# Patient Record
Sex: FEMALE | Race: WHITE | NOT HISPANIC OR LATINO | Employment: FULL TIME | URBAN - METROPOLITAN AREA
[De-identification: names, ages, dates, MRNs, and addresses within clinical notes are randomized per-mention and may not be internally consistent; named-entity substitution may affect disease eponyms.]

---

## 2017-02-06 ENCOUNTER — OFFICE VISIT (OUTPATIENT)
Dept: INTERNAL MEDICINE | Facility: CLINIC | Age: 37
End: 2017-02-06
Attending: FAMILY MEDICINE
Payer: COMMERCIAL

## 2017-02-06 VITALS
SYSTOLIC BLOOD PRESSURE: 128 MMHG | TEMPERATURE: 98 F | HEART RATE: 70 BPM | WEIGHT: 167.56 LBS | HEIGHT: 64 IN | DIASTOLIC BLOOD PRESSURE: 74 MMHG | BODY MASS INDEX: 28.6 KG/M2

## 2017-02-06 DIAGNOSIS — J01.00 ACUTE MAXILLARY SINUSITIS, RECURRENCE NOT SPECIFIED: ICD-10-CM

## 2017-02-06 DIAGNOSIS — F98.8 ADD (ATTENTION DEFICIT DISORDER): Primary | ICD-10-CM

## 2017-02-06 PROCEDURE — 96372 THER/PROPH/DIAG INJ SC/IM: CPT | Mod: S$GLB,,, | Performed by: FAMILY MEDICINE

## 2017-02-06 PROCEDURE — 99214 OFFICE O/P EST MOD 30 MIN: CPT | Mod: 25,S$GLB,, | Performed by: FAMILY MEDICINE

## 2017-02-06 PROCEDURE — 99999 PR PBB SHADOW E&M-EST. PATIENT-LVL III: CPT | Mod: PBBFAC,,, | Performed by: FAMILY MEDICINE

## 2017-02-06 RX ORDER — TRIAMCINOLONE ACETONIDE 40 MG/ML
40 INJECTION, SUSPENSION INTRA-ARTICULAR; INTRAMUSCULAR
Status: COMPLETED | OUTPATIENT
Start: 2017-02-06 | End: 2017-02-06

## 2017-02-06 RX ORDER — AMOXICILLIN AND CLAVULANATE POTASSIUM 875; 125 MG/1; MG/1
1 TABLET, FILM COATED ORAL 2 TIMES DAILY
Qty: 20 TABLET | Refills: 0 | Status: SHIPPED | OUTPATIENT
Start: 2017-02-06 | End: 2017-02-16

## 2017-02-06 RX ADMIN — TRIAMCINOLONE ACETONIDE 40 MG: 40 INJECTION, SUSPENSION INTRA-ARTICULAR; INTRAMUSCULAR at 09:02

## 2017-02-06 NOTE — PROGRESS NOTES
"CHIEF COMPLAINT:  One week of URI symptoms in a patient with ADD    HISTORY OF PRESENT ILLNESS: The patient is a generally healthy 36 year-old white female.  For the past week she's had increasing upper respiratory symptoms.  She's having a lot of drainage and some cough.  She is also having some chest congestion.  No fevers or chills.  No hemoptysis.  Over-the-counter medications are not helping.    She has a long history of ADD for which she previously took Adderall.  Blood pressure weight and pulse are stable    REVIEW OF SYSTEMS:  GENERAL: No fever, chills, fatigability or weight loss.  SKIN: No rashes, itching or changes in color or texture of skin.  HEAD: No headaches or recent head trauma.  EYES: Visual acuity fine. No photophobia, ocular pain or diplopia.  EARS: Denies ear pain, discharge or vertigo.  NOSE: No loss of smell, no epistaxis.  MOUTH & THROAT: No hoarseness or change in voice. No excessive gum bleeding.  NODES: Denies swollen glands.  CHEST: Denies ALFARO, cyanosis, wheezing.  CARDIOVASCULAR: Denies chest pain, PND, orthopnea or reduced exercise tolerance.  ABDOMEN: Appetite fine. No weight loss. Denies diarrhea, abdominal pain, hematemesis or blood in stool.  URINARY: No flank pain, dysuria or hematuria.  PERIPHERAL VASCULAR: No claudication or cyanosis.  MUSCULOSKELETAL: No joint stiffness or swelling.   NEUROLOGIC: No history of seizures, paralysis, alteration of gait or coordination.    SOCIAL HISTORY: The patient does not smoke.  The patient consumes alcohol socially.  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    PHYSICAL EXAMINATION:   Blood pressure 128/74, pulse 70, temperature 97.7 °F (36.5 °C), height 5' 4" (1.626 m), weight 76 kg (167 lb 8.8 oz), unknown if currently breastfeeding.  APPEARANCE: Well nourished, well developed, in no acute distress.    HEAD: Normocephalic, atraumatic.  EYES: PERRL. EOMI.  Conjunctivae without injection and  anicteric  EARS: TM's intact. " Light reflex normal. No retraction or perforation.    NOSE: Mucosa pink. Airway clear.  MOUTH & THROAT: No tonsillar enlargement. No pharyngeal erythema or exudate. No stridor.  NECK: Supple.   NODES: No cervical, axillary or inguinal lymph node enlargement.  CHEST: Lungs clear to auscultation.  No retractions are noted.  No rales or rhonchi are present.  CARDIOVASCULAR: Normal S1, S2. No rubs, murmurs or gallops.  ABDOMEN: Bowel sounds normal. Not distended. Soft. No tenderness or masses.  No ascites is noted.  MUSCULOSKELETAL:  There is no clubbing, cyanosis, or edema of the extremities x4.  There is full range of motion of the lumbar spine.  There is full range of motion of the extremities x4.  There is no deformity noted.    NEUROLOGIC:       Normal speech development.      Hearing normal.      Slightly antalgic gait.      Motor and sensory exams grossly normal.      DTR's normal.  PSYCHIATRIC: Patient is alert and oriented x3.  Thought processes are all normal.  There is no homicidality.  There is no suicidality.  There is no evidence of psychosis.    LABORATORY/RADIOLOGY:   Chart reviewed.      ASSESSMENT:   Acute bronchitis  ADD     PLAN:  A Kenalog injection was given in the clinic today.  I discussed the risks and benefits of steroid injection with the patient.  The risks include liponecrosis, exacerbation of diabetes, specifically elevated blood sugars, adrenal suppression, and markedly elevated mood and energy.  The patient is aware of and accepts these risks.  10 day course of Augmentin  Adderall 15 mg by mouth twice a day  Return to clinic in 3 months

## 2017-02-06 NOTE — MR AVS SNAPSHOT
[facility]  [address]  [facility] [address]  Phone: [phone]  Fax: [phone]                  Patrizia Bullock   2017 8:00 AM   Office Visit    Description:  Female : 1980   Provider:  Prince Horton MD   Department:  [facility]           Reason for Visit     Sinus Problem           Diagnoses this Visit        Comments    Acute maxillary sinusitis, recurrence not specified    -  Primary            To Do List           Future Appointments        Provider Department Dept Phone    2017 9:00 AM Gregory Elise OD [facility] [phone]      Goals (5 Years of Data)     None       These Medications        Disp Refills Start End    amoxicillin-clavulanate 875-125mg (AUGMENTIN) 875-125 mg per tablet 20 tablet 0 2017    Take 1 tablet by mouth 2 (two) times daily. - Oral    Pharmacy: [facility] - [place], [place] - [address] Ph #: [phone]         [facility]     [facility] -  Assistance  Registered nurses in the [facility] provide clinical advisement, health education, appointment booking, and other advisory services.  Call for this free service at [phone].             Medications           Message regarding Medications     Verify the changes and/or additions to your medication regime listed below are the same as discussed with your clinician today.  If any of these changes or additions are incorrect, please notify your healthcare provider.        START taking these NEW medications        Refills    amoxicillin-clavulanate 875-125mg (AUGMENTIN) 875-125 mg per tablet 0    Sig: Take 1 tablet by mouth 2 (two) times daily.    Class: Normal    Route: Oral      These medications were administered today        Dose Freq    triamcinolone acetonide injection 40 mg 40 mg Clinic/HOD 1 time    Sig: Inject 1 mL (40 mg total) into  "the muscle one time.    Class: Normal    Route: Intramuscular      STOP taking these medications     alprazolam (XANAX) 0.5 MG tablet            Verify that the below list of medications is an accurate representation of the medications you are currently taking.  If none reported, the list may be blank. If incorrect, please contact your healthcare provider. Carry this list with you in case of emergency.           Current Medications     dextroamphetamine-amphetamine (ADDERALL) 10 mg Tab Take 1 tablet by mouth 3 (three) times daily.    dextroamphetamine-amphetamine (ADDERALL) 10 mg Tab Take 1 tablet by mouth 3 (three) times daily.    dextroamphetamine-amphetamine (ADDERALL) 10 mg Tab Take 1 tablet by mouth 3 (three) times daily.    esomeprazole (NEXIUM) 20 MG capsule Take 20 mg by mouth before breakfast.    MINASTRIN 24 FE 1 mg-20 mcg(24) /75 mg (4) Chew     amoxicillin-clavulanate 875-125mg (AUGMENTIN) 875-125 mg per tablet Take 1 tablet by mouth 2 (two) times daily.           Clinical Reference Information           Your Vitals Were     BP Pulse Temp Height Weight BMI    128/74 70 97.7 °F (36.5 °C) 5' 4" (1.626 m) 76 kg (167 lb 8.8 oz) 28.76 kg/m2      Blood Pressure          Most Recent Value    BP  128/74      Allergies as of 2/6/2017     Erythromycin Estolate    Sulfa (Sulfonamide Antibiotics)      Immunizations Administered on Date of Encounter - 2/6/2017     None      Administrations This Visit     triamcinolone acetonide injection 40 mg     Admin Date Action Dose Route Administered By             02/06/2017 Given 40 mg Intramuscular Deysi Escalona LPN                      Instructions    Your test results will be communicated to you via: [facility], Telephone or Letter.  If you have not received your test results within one week. Please contact the clinic.           Language Assistance Services     ATTENTION: Language assistance services are available, free of charge. Please call [phone].      ATENCIÓN: " Si habla español, tiene a lauren disposición servicios gratuitos de asistencia lingüística. Llame al [phone].     CHÚ Ý: N?u b?n nói Ti?ng Vi?t, có các d?ch v? h? tr? ngôn ng? mi?n phí dành cho b?n. G?i s? [phone].         [facility] complies with applicable Federal civil rights laws and does not discriminate on the basis of race, color, national origin, age, disability, or sex.

## 2017-02-06 NOTE — PROGRESS NOTES
Patient given Kenalog 40mg/ml in the Left Upper Outer Quad Gluteus. Patient tolerated well and Band-Aid was applied. Lot#[id number]. Patient advised to wait in the lobby for 15 min to make sure no adverse reactions occur. Patient states verbal understanding and has no further questions.

## 2017-02-07 ENCOUNTER — PATIENT MESSAGE (OUTPATIENT)
Dept: INTERNAL MEDICINE | Facility: CLINIC | Age: 37
End: 2017-02-07

## 2017-02-09 ENCOUNTER — PATIENT MESSAGE (OUTPATIENT)
Dept: INTERNAL MEDICINE | Facility: CLINIC | Age: 37
End: 2017-02-09

## 2017-02-09 ENCOUNTER — HOSPITAL ENCOUNTER (OUTPATIENT)
Dept: RADIOLOGY | Facility: HOSPITAL | Age: 37
Discharge: HOME | End: 2017-02-09
Attending: FAMILY MEDICINE
Payer: COMMERCIAL

## 2017-02-09 DIAGNOSIS — R06.02 SOB (SHORTNESS OF BREATH): Primary | ICD-10-CM

## 2017-02-09 DIAGNOSIS — R06.02 SOB (SHORTNESS OF BREATH): ICD-10-CM

## 2017-02-09 PROCEDURE — 71020 XR CHEST PA AND LATERAL: CPT | Mod: 26,,, | Performed by: RADIOLOGY

## 2017-02-09 PROCEDURE — 71020 XR CHEST PA AND LATERAL: CPT | Mod: TC

## 2017-02-09 RX ORDER — PREDNISONE 10 MG/1
10 TABLET ORAL 2 TIMES DAILY
Qty: 14 TABLET | Refills: 0 | Status: SHIPPED | OUTPATIENT
Start: 2017-02-09 | End: 2017-02-16

## 2017-02-13 ENCOUNTER — TELEPHONE (OUTPATIENT)
Dept: INTERNAL MEDICINE | Facility: CLINIC | Age: 37
End: 2017-02-13

## 2017-02-13 ENCOUNTER — PATIENT MESSAGE (OUTPATIENT)
Dept: INTERNAL MEDICINE | Facility: CLINIC | Age: 37
End: 2017-02-13

## 2017-02-13 NOTE — TELEPHONE ENCOUNTER
----- Message from Albania Mcdonough sent at 2/13/2017  3:42 PM CST -----  Contact: Dutch MCGEE_  1st Request  _  2nd Request  _  3rd Request    Who:Molly with [facility]     Why: Molly with [facility] states she would like a call back with the diagnoses code for the patient Adderall prescription     What Number to Call Back: Ext [phone]    When to Expect a call back: (Before the end of the day)   -- if call after 3:00 call back will be tomorrow.

## 2017-02-16 ENCOUNTER — OFFICE VISIT (OUTPATIENT)
Dept: OPTOMETRY | Facility: CLINIC | Age: 37
End: 2017-02-16
Payer: COMMERCIAL

## 2017-02-16 DIAGNOSIS — H52.13 MYOPIA OF BOTH EYES: Primary | ICD-10-CM

## 2017-02-16 PROCEDURE — 99999 PR PBB SHADOW E&M-EST. PATIENT-LVL II: CPT | Mod: PBBFAC,,, | Performed by: OPTOMETRIST

## 2017-02-16 PROCEDURE — 92310 CONTACT LENS FITTING OU: CPT | Mod: ,,, | Performed by: OPTOMETRIST

## 2017-02-16 PROCEDURE — 92014 COMPRE OPH EXAM EST PT 1/>: CPT | Mod: S$GLB,,, | Performed by: OPTOMETRIST

## 2017-02-16 PROCEDURE — 92015 DETERMINE REFRACTIVE STATE: CPT | Mod: S$GLB,,, | Performed by: OPTOMETRIST

## 2017-02-16 NOTE — PROGRESS NOTES
HPI     DLS: 12/24/15    Pt here for check of ocular health.  Pt without visual complaints today   OU.  Pt states she isn't happy with the ProClear contacts.     (-)flashes  (-)floaters  (-)itching  (-)tearing  (-)burning  (+)headaches  (-)eye pain        Last edited by Casi Campuzano MA on 2/16/2017  9:17 AM.     ROS     Negative for: Constitutional, Gastrointestinal, Neurological, Skin,   Genitourinary, Musculoskeletal, HENT, Endocrine, Cardiovascular, Eyes,   Respiratory, Psychiatric, Allergic/Imm, Heme/Lymph    Last edited by Gregory Elise, OD on 2/16/2017  9:29 AM. (History)        Assessment /Plan     For exam results, see Encounter Report.    Myopia of both eyes      1. Spec Rx given and  Contact lens trials dispensed to pt. Daily wear only advised, with education to risks of extended wear.  Discussed lens care, compliance and solutions.  RTC for dfe, pt chose to defer until later date . Different lens options discussed with patient.

## 2017-02-24 ENCOUNTER — OFFICE VISIT (OUTPATIENT)
Dept: OPTOMETRY | Facility: CLINIC | Age: 37
End: 2017-02-24
Payer: COMMERCIAL

## 2017-02-24 DIAGNOSIS — H10.533 CONTACT BLEPHAROCONJUNCTIVITIS OF BOTH EYES: Primary | ICD-10-CM

## 2017-02-24 PROCEDURE — 92012 INTRM OPH EXAM EST PATIENT: CPT | Mod: S$GLB,,, | Performed by: OPTOMETRIST

## 2017-02-24 PROCEDURE — 99999 PR PBB SHADOW E&M-EST. PATIENT-LVL II: CPT | Mod: PBBFAC,,, | Performed by: OPTOMETRIST

## 2017-02-24 RX ORDER — TOBRAMYCIN 3 MG/ML
1 SOLUTION/ DROPS OPHTHALMIC 4 TIMES DAILY
Qty: 5 ML | Refills: 0 | Status: SHIPPED | OUTPATIENT
Start: 2017-02-24 | End: 2017-03-03

## 2017-02-24 NOTE — MEDICAL/APP STUDENT
Eye irritation. Started 2 days ago after riding in [employer]  Mucous discharge and crusty in the morning.  Pain OS>OD  (+) light sensitivity    No change in vision.   Floaters OS started yesterday afternoon    (-)flashes of light    No gtts

## 2017-02-24 NOTE — PROGRESS NOTES
HPI     Eye Problem    Additional comments: Left eye           Comments   Mucous and redness OS>OD x 1-2 days, noticed after parade  Was wearing contacts  No vision loss         Last edited by Gregory Elise, OD on 2/24/2017 10:23 AM. (History)        ROS     Negative for: Constitutional, Gastrointestinal, Neurological, Skin,   Genitourinary, Musculoskeletal, HENT, Endocrine, Cardiovascular, Eyes,   Respiratory, Psychiatric, Allergic/Imm, Heme/Lymph    Last edited by Gregory Elise, OD on 2/24/2017 10:17 AM. (History)        Assessment /Plan     For exam results, see Encounter Report.    Contact blepharoconjunctivitis of both eyes  -     tobramycin sulfate 0.3% (TOBREX) 0.3 % ophthalmic solution; Place 1 drop into both eyes 4 (four) times daily.  Dispense: 5 mL; Refill: 0      1. Start Tobrex drops 1 drop 4x/day x 1 week. No contact lens wear. OK for lid scrubs also. Ocusoft or baby shampoo. RTC 1 week if no better.

## 2017-03-08 ENCOUNTER — OFFICE VISIT (OUTPATIENT)
Dept: DERMATOLOGY | Facility: CLINIC | Age: 37
End: 2017-03-08
Payer: COMMERCIAL

## 2017-03-08 DIAGNOSIS — D22.9 NEVUS: ICD-10-CM

## 2017-03-08 DIAGNOSIS — D18.00 ANGIOMA: ICD-10-CM

## 2017-03-08 DIAGNOSIS — L82.1 SK (SEBORRHEIC KERATOSIS): ICD-10-CM

## 2017-03-08 DIAGNOSIS — L81.4 LENTIGO: ICD-10-CM

## 2017-03-08 DIAGNOSIS — D48.9 NEOPLASM OF UNCERTAIN BEHAVIOR: Primary | ICD-10-CM

## 2017-03-08 PROCEDURE — 99999 PR PBB SHADOW E&M-EST. PATIENT-LVL II: CPT | Mod: PBBFAC,,, | Performed by: DERMATOLOGY

## 2017-03-08 PROCEDURE — 88342 IMHCHEM/IMCYTCHM 1ST ANTB: CPT | Mod: 26,,, | Performed by: PATHOLOGY

## 2017-03-08 PROCEDURE — 11101 PR BIOPSY, EACH ADDED LESION: CPT | Mod: S$GLB,,, | Performed by: DERMATOLOGY

## 2017-03-08 PROCEDURE — 99203 OFFICE O/P NEW LOW 30 MIN: CPT | Mod: 25,S$GLB,, | Performed by: DERMATOLOGY

## 2017-03-08 PROCEDURE — 88305 TISSUE EXAM BY PATHOLOGIST: CPT | Performed by: PATHOLOGY

## 2017-03-08 PROCEDURE — 11100 PR BIOPSY OF SKIN LESION: CPT | Mod: S$GLB,,, | Performed by: DERMATOLOGY

## 2017-03-08 NOTE — PROGRESS NOTES
"  Subjective:       Patient ID:  Patrizia Bullock is a 36 y.o. female who presents for   Chief Complaint   Patient presents with    Skin Check     tbse     HPI Comments: Patient is here today for a "mole" check.   Pt has a history of  significant sun exposure in the past.   Pt recalls several blistering sunburns in the past- yes  Pt has history of tanning bed use- yes  Pt has  had moles removed in the past- yes  Pt has history of melanoma in first degree relatives-  Yes, dad    Pt has a mole on left leg x 5 years.  getting larger.  Not bleeding or tender. No tx.       Review of Systems   Constitutional: Negative for fever, chills, weight loss, fatigue, night sweats and malaise.   Skin: Positive for daily sunscreen use. Negative for activity-related sunscreen use.   Hematologic/Lymphatic: Negative for adenopathy. Does not bruise/bleed easily.        Objective:    Physical Exam   Constitutional: She appears well-developed and well-nourished. No distress.   Neurological: She is alert and oriented to person, place, and time. She is not disoriented.   Psychiatric: She has a normal mood and affect.   Skin:   Areas Examined (abnormalities noted in diagram):   Scalp / Hair Palpated and Inspected  Head / Face Inspection Performed  Neck Inspection Performed  Chest / Axilla Inspection Performed  Abdomen Inspection Performed  Genitals / Buttocks / Groin Inspection Performed  Back Inspection Performed  RUE Inspected  LUE Inspection Performed  RLE Inspected  LLE Inspection Performed  Nails and Digits Inspection Performed                       Diagram Legend     Erythematous scaling macule/papule c/w actinic keratosis       Vascular papule c/w angioma      Pigmented verrucoid papule/plaque c/w seborrheic keratosis      Yellow umbilicated papule c/w sebaceous hyperplasia      Irregularly shaped tan macule c/w lentigo     1-2 mm smooth white papules consistent with Milia      Movable subcutaneous cyst with punctum c/w epidermal " inclusion cyst      Subcutaneous movable cyst c/w pilar cyst      Firm pink to brown papule c/w dermatofibroma      Pedunculated fleshy papule(s) c/w skin tag(s)      Evenly pigmented macule c/w junctional nevus     Mildly variegated pigmented, slightly irregular-bordered macule c/w mildly atypical nevus      Flesh colored to evenly pigmented papule c/w intradermal nevus       Pink pearly papule/plaque c/w basal cell carcinoma      Erythematous hyperkeratotic cursted plaque c/w SCC      Surgical scar with no sign of skin cancer recurrence      Open and closed comedones      Inflammatory papules and pustules      Verrucoid papule consistent consistent with wart     Erythematous eczematous patches and plaques     Dystrophic onycholytic nail with subungual debris c/w onychomycosis     Umbilicated papule    Erythematous-base heme-crusted tan verrucoid plaque consistent with inflamed seborrheic keratosis     Erythematous Silvery Scaling Plaque c/w Psoriasis     See annotation      Assessment / Plan:      Pathology Orders:      Normal Orders This Visit    Tissue Specimen To Pathology, Dermatology     Questions:    Directional Terms:  Other(comment)    Clinical information:  r/o atypical nevus    Specific Site:  left upper back    Tissue Specimen To Pathology, Dermatology     Questions:    Directional Terms:  Other(comment)    Clinical information:  r/o atypical nevus    Specific Site:  left lower posterior calf        Neoplasm of uncertain behavior  Shave biopsy procedure note: x 2     Shave biopsy performed after verbal consent including risk of infection, scar, recurrence, need for additional treatment of site. Area prepped with alcohol, anesthetized with approximately 1.0cc of 1% lidocaine with epinephrine. Lesional tissue shaved with razor blade. Hemostasis achieved with application of aluminum chloride followed by hyfrecation. No complications. Dressing applied. Wound care explained.      -     Tissue Specimen To  Pathology, Dermatology  -     Tissue Specimen To Pathology, Dermatology    Angioma  These are benign vascular lesions that are inherited.  Treatment is not necessary.    Lentigo  This is a benign hyperpigmented sun induced lesion. Daily sun protection will reduce the number of new lesions. Treatment of these benign lesions are considered cosmetic.  The nature of sun-induced photo-aging and skin cancers is discussed.  Sun avoidance, protective clothing, and the use of 30-SPF sunscreens is advised. Observe closely for skin damage/changes, and call if such occurs.    Nevus  Discussed ABCDE's of nevi.  Monitor for new mole or moles that are becoming bigger, darker, irritated, or developing irregular borders. Brochure provided.    SK (seborrheic keratosis)  These are benign inherited growths without a malignant potential. Reassurance given to patient. No treatment is necessary.              Return in about 1 year (around 3/8/2018).

## 2017-03-10 ENCOUNTER — OFFICE VISIT (OUTPATIENT)
Dept: PODIATRY | Facility: CLINIC | Age: 37
End: 2017-03-10
Payer: COMMERCIAL

## 2017-03-10 VITALS
HEIGHT: 64 IN | DIASTOLIC BLOOD PRESSURE: 76 MMHG | HEART RATE: 70 BPM | WEIGHT: 170 LBS | BODY MASS INDEX: 29.02 KG/M2 | SYSTOLIC BLOOD PRESSURE: 111 MMHG

## 2017-03-10 DIAGNOSIS — L90.5 SCAR: Primary | ICD-10-CM

## 2017-03-10 DIAGNOSIS — M79.674 TOE PAIN, RIGHT: ICD-10-CM

## 2017-03-10 PROCEDURE — 11055 PARING/CUTG B9 HYPRKER LES 1: CPT | Mod: S$GLB,,, | Performed by: PODIATRIST

## 2017-03-10 PROCEDURE — 99999 PR PBB SHADOW E&M-EST. PATIENT-LVL III: CPT | Mod: PBBFAC,,, | Performed by: PODIATRIST

## 2017-03-10 PROCEDURE — 99202 OFFICE O/P NEW SF 15 MIN: CPT | Mod: 25,S$GLB,, | Performed by: PODIATRIST

## 2017-03-10 RX ORDER — UREA 40 %
CREAM (GRAM) TOPICAL DAILY
Qty: 28 G | Refills: 11 | Status: SHIPPED | OUTPATIENT
Start: 2017-03-10 | End: 2017-10-17

## 2017-03-10 NOTE — MR AVS SNAPSHOT
[facility]  [address]  [facility] [address]  Phone: [phone]                  Patrizia Bullock   3/10/2017 10:00 AM   Office Visit    Description:  Female : 1980   Provider:  Gurwinder Clement DPM   Department:  [facility]           Reason for Visit     Foot Problem           Diagnoses this Visit        Comments    Scar    -  Primary     Toe pain, right                To Do List           Future Appointments        Provider Department Dept Phone    3/15/2017 9:20 AM Prince Horton MD [facility] Internal Medicine [phone]      Goals (5 Years of Data)     None      Follow-Up and Disposition     Return if symptoms worsen or fail to improve.       These Medications        Disp Refills Start End    urea (CARMOL) 40 % Crea 28 g 11 3/10/2017     Apply topically once daily. - Topical    Pharmacy: [id number] [place] [facility] [address] [facility]: [phone]         [facility]     [facility] Care [facility] -  Assistance  Registered nurses in the [facility] On Call Center provide clinical advisement, health education, appointment booking, and other advisory services.  Call for this free service at [phone].             Medications           Message regarding Medications     Verify the changes and/or additions to your medication regime listed below are the same as discussed with your clinician today.  If any of these changes or additions are incorrect, please notify your healthcare provider.        START taking these NEW medications        Refills    urea (CARMOL) 40 % Crea 11    Sig: Apply topically once daily.    Class: Normal    Route: Topical           Verify that the below list of medications is an accurate representation of the medications you are currently taking.  If none reported, the list may be blank. If incorrect, please contact your healthcare provider. Carry this list with you in case of emergency.           Current  "Medications     dextroamphetamine-amphetamine (ADDERALL) 10 mg Tab Take 1 tablet by mouth 3 (three) times daily.    esomeprazole (NEXIUM) 20 MG capsule Take 20 mg by mouth before breakfast.    urea (CARMOL) 40 % Crea Apply topically once daily.           Clinical Reference Information           Your Vitals Were     BP Pulse Height Weight BMI    111/76 70 5' 4" (1.626 m) 77.1 kg (170 lb) 29.18 kg/m2      Blood Pressure          Most Recent Value    BP  111/76      Allergies as of 3/10/2017     Erythromycin Estolate    Sulfa (Sulfonamide Antibiotics)      Immunizations Administered on Date of Encounter - 3/10/2017     None      Language Assistance Services     ATTENTION: Language assistance services are available, free of charge. Please call [phone].      ATENCIÓN: Si светлана suni, tiene a lauren disposición servicios gratuitos de asistencia lingüística. Llame al [phone].     KOBY Ý: N?u b?n nói Ti?ng Vi?t, có các d?ch v? h? tr? ngôn ng? mi?n phí dành cho b?n. G?i s? [phone].         [facility] complies with applicable Federal civil rights laws and does not discriminate on the basis of race, color, national origin, age, disability, or sex.        "

## 2017-03-10 NOTE — PROGRESS NOTES
Subjective:      Patient ID: Patrizia Bullock is a 36 y.o. female.    Chief Complaint: Foot Problem (glass been stuck in foot for a tr in a half)    Sharp deep pain right great toe with skin lesion she removed shards of glass from about a year and a half ago.  Denies repeat puncture wound.  Gradual onset, worsening over past several weeks, aggravated by increased weight bearing, shoe gear, pressure.  No previous medical treatment. Only tx is self removal glass.      Review of Systems   Constitution: Negative for chills, diaphoresis, fever, malaise/fatigue and night sweats.   Cardiovascular: Negative for claudication, cyanosis, leg swelling and syncope.   Skin: Positive for suspicious lesions. Negative for color change, dry skin, rash and unusual hair distribution.   Musculoskeletal: Negative for falls, joint pain, joint swelling, muscle cramps, muscle weakness and stiffness.   Gastrointestinal: Negative for constipation, diarrhea, nausea and vomiting.   Neurological: Negative for brief paralysis, disturbances in coordination, focal weakness, numbness, paresthesias, sensory change and tremors.           Objective:      Physical Exam   Constitutional: She appears well-developed and well-nourished. She is cooperative. No distress.   Cardiovascular:   Pulses:       Popliteal pulses are 2+ on the right side, and 2+ on the left side.        Dorsalis pedis pulses are 2+ on the right side, and 2+ on the left side.        Posterior tibial pulses are 2+ on the right side, and 2+ on the left side.   Capillary refill 3 seconds all toes/distal feet, all toes/both feet warm to touch.      Negative lymphadenopathy bilateral popliteal fossa and tarsal tunnel.      Negavie lower extremity edema bilateral.     Musculoskeletal:        Right ankle: Normal. She exhibits normal range of motion, no swelling, no ecchymosis, no deformity, no laceration and normal pulse. Achilles tendon normal. Achilles tendon exhibits no pain, no defect  and normal Riizarry's test results.   Normal angle, base, station of gait. All ten toes without clubbing, cyanosis, or signs of ischemia.  No pain to palpation bilateral lower extremities.  Range of motion, stability, muscle strength, and muscle tone normal bilateral feet and legs.     Lymphadenopathy: No inguinal adenopathy noted on the right or left side.   Negative lymphadenopathy bilateral popliteal fossa and tarsal tunnel.   Neurological: She is alert. She has normal strength. She displays no atrophy and no tremor. No sensory deficit. She exhibits normal muscle tone. She displays no seizure activity. Gait normal.   Reflex Scores:       Patellar reflexes are 2+ on the right side and 2+ on the left side.       Achilles reflexes are 2+ on the right side and 2+ on the left side.  Negative tinel sign to percussion sural, superficial peroneal, deep peroneal, saphenous, and posterior tibial nerves right and left ankles and feet.     Skin: Skin is warm, dry and intact. No abrasion, no bruising, no burn, no ecchymosis, no laceration, no lesion and no rash noted. She is not diaphoretic. No cyanosis or erythema. No pallor. Nails show no clubbing.     Focal hyperkeratotic scar lesion consisting entirely of hyperkeratotic tissue without open skin, drainage, pus, fluctuance, malodor, or signs of infection plantar central right hallux beneath distal phalanx.    Otherwise,Skin is normal age and health appropriate color, turgor, texture, and temperature bilateral lower extremities without ulceration, hyperpigmentation, discoloration, masses nodules or cords palpated.  No ecchymosis, erythema, edema, or cardinal signs of infection bilateral lower extremities.  .                 Assessment:       Encounter Diagnoses   Name Primary?    Scar Yes    Toe pain, right          Plan:       Patrizia was seen today for foot problem.    Diagnoses and all orders for this visit:    Scar    Toe pain, right    Other orders  -     urea  (CARMOL) 40 % Crea; Apply topically once daily.      I counseled the patient on her conditions, their implications and medical management.        With the patient's permission, I debrided hyperkeratotic scar lesion(s) as above totaling      1 plantar right hallux          to, not  Including dermis with sterile #15 blade.  Patient tolerated the procedure well and related significant relief.    Rx urea.    Discussed conservative treatment with shoes of adequate dimensions, material, and style to alleviate symptoms and delay or prevent surgical intervention.         Return if symptoms worsen or fail to improve.

## 2017-03-16 ENCOUNTER — PATIENT MESSAGE (OUTPATIENT)
Dept: DERMATOLOGY | Facility: CLINIC | Age: 37
End: 2017-03-16

## 2017-03-16 ENCOUNTER — OFFICE VISIT (OUTPATIENT)
Dept: INTERNAL MEDICINE | Facility: CLINIC | Age: 37
End: 2017-03-16
Attending: FAMILY MEDICINE
Payer: COMMERCIAL

## 2017-03-16 VITALS
BODY MASS INDEX: 28 KG/M2 | HEART RATE: 80 BPM | HEIGHT: 64 IN | WEIGHT: 164 LBS | SYSTOLIC BLOOD PRESSURE: 104 MMHG | DIASTOLIC BLOOD PRESSURE: 78 MMHG

## 2017-03-16 DIAGNOSIS — F98.8 ADD (ATTENTION DEFICIT DISORDER): Primary | ICD-10-CM

## 2017-03-16 PROCEDURE — 99999 PR PBB SHADOW E&M-EST. PATIENT-LVL II: CPT | Mod: PBBFAC,,, | Performed by: FAMILY MEDICINE

## 2017-03-16 PROCEDURE — 99213 OFFICE O/P EST LOW 20 MIN: CPT | Mod: S$GLB,,, | Performed by: FAMILY MEDICINE

## 2017-03-16 RX ORDER — DEXTROAMPHETAMINE SACCHARATE, AMPHETAMINE ASPARTATE, DEXTROAMPHETAMINE SULFATE AND AMPHETAMINE SULFATE 2.5; 2.5; 2.5; 2.5 MG/1; MG/1; MG/1; MG/1
1 TABLET ORAL 3 TIMES DAILY
Qty: 90 TABLET | Refills: 0 | Status: SHIPPED | OUTPATIENT
Start: 2017-03-16 | End: 2017-06-14

## 2017-03-16 NOTE — PROGRESS NOTES
"CHIEF COMPLAINT:  Medication check in a patient with ADD    HISTORY OF PRESENT ILLNESS: The patient is a generally healthy 36 year-old white female.  She has a long history of ADD for which she previously took Adderall.  Blood pressure weight and pulse are stable    REVIEW OF SYSTEMS:  GENERAL: No fever, chills, fatigability or weight loss.  SKIN: No rashes, itching or changes in color or texture of skin.  HEAD: No headaches or recent head trauma.  EYES: Visual acuity fine. No photophobia, ocular pain or diplopia.  EARS: Denies ear pain, discharge or vertigo.  NOSE: No loss of smell, no epistaxis.  MOUTH & THROAT: No hoarseness or change in voice. No excessive gum bleeding.  NODES: Denies swollen glands.  CHEST: Denies ALFARO, cyanosis, wheezing.  CARDIOVASCULAR: Denies chest pain, PND, orthopnea or reduced exercise tolerance.  ABDOMEN: Appetite fine. No weight loss. Denies diarrhea, abdominal pain, hematemesis or blood in stool.  URINARY: No flank pain, dysuria or hematuria.  PERIPHERAL VASCULAR: No claudication or cyanosis.  MUSCULOSKELETAL: No joint stiffness or swelling.   NEUROLOGIC: No history of seizures, paralysis, alteration of gait or coordination.    SOCIAL HISTORY: The patient does not smoke.  The patient consumes alcohol socially.  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    PHYSICAL EXAMINATION:   Blood pressure 104/78, pulse 80, height 5' 4" (1.626 m), weight 74.4 kg (164 lb), unknown if currently breastfeeding.  APPEARANCE: Well nourished, well developed, in no acute distress.    HEAD: Normocephalic, atraumatic.  EYES: PERRL. EOMI.  Conjunctivae without injection and  anicteric  EARS: TM's intact. Light reflex normal. No retraction or perforation.    NOSE: Mucosa pink. Airway clear.  MOUTH & THROAT: No tonsillar enlargement. No pharyngeal erythema or exudate. No stridor.  NECK: Supple.   NODES: No cervical, axillary or inguinal lymph node enlargement.  CHEST: Lungs clear to " auscultation.  No retractions are noted.  No rales or rhonchi are present.  CARDIOVASCULAR: Normal S1, S2. No rubs, murmurs or gallops.  ABDOMEN: Bowel sounds normal. Not distended. Soft. No tenderness or masses.  No ascites is noted.  MUSCULOSKELETAL:  There is no clubbing, cyanosis, or edema of the extremities x4.  There is full range of motion of the lumbar spine.  There is full range of motion of the extremities x4.  There is no deformity noted.    NEUROLOGIC:       Normal speech development.      Hearing normal.      Slightly antalgic gait.      Motor and sensory exams grossly normal.      DTR's normal.  PSYCHIATRIC: Patient is alert and oriented x3.  Thought processes are all normal.  There is no homicidality.  There is no suicidality.  There is no evidence of psychosis.    LABORATORY/RADIOLOGY:   Chart reviewed.      ASSESSMENT:   ADD     PLAN:  Adderall 10 mg by mouth tid  Return to clinic in 3 months

## 2017-06-14 ENCOUNTER — OFFICE VISIT (OUTPATIENT)
Dept: INTERNAL MEDICINE | Facility: CLINIC | Age: 37
End: 2017-06-14
Attending: FAMILY MEDICINE
Payer: COMMERCIAL

## 2017-06-14 VITALS
WEIGHT: 173.31 LBS | HEIGHT: 64 IN | BODY MASS INDEX: 29.59 KG/M2 | HEART RATE: 75 BPM | DIASTOLIC BLOOD PRESSURE: 76 MMHG | SYSTOLIC BLOOD PRESSURE: 122 MMHG

## 2017-06-14 DIAGNOSIS — F98.8 ADD (ATTENTION DEFICIT DISORDER): Primary | ICD-10-CM

## 2017-06-14 PROCEDURE — 99999 PR PBB SHADOW E&M-EST. PATIENT-LVL III: CPT | Mod: PBBFAC,,, | Performed by: FAMILY MEDICINE

## 2017-06-14 PROCEDURE — 99213 OFFICE O/P EST LOW 20 MIN: CPT | Mod: S$GLB,,, | Performed by: FAMILY MEDICINE

## 2017-06-14 RX ORDER — DEXTROAMPHETAMINE SACCHARATE, AMPHETAMINE ASPARTATE, DEXTROAMPHETAMINE SULFATE AND AMPHETAMINE SULFATE 2.5; 2.5; 2.5; 2.5 MG/1; MG/1; MG/1; MG/1
1 TABLET ORAL 3 TIMES DAILY
Qty: 90 TABLET | Refills: 0 | Status: CANCELLED | OUTPATIENT
Start: 2017-06-14

## 2017-06-14 RX ORDER — DEXTROAMPHETAMINE SACCHARATE, AMPHETAMINE ASPARTATE, DEXTROAMPHETAMINE SULFATE AND AMPHETAMINE SULFATE 2.5; 2.5; 2.5; 2.5 MG/1; MG/1; MG/1; MG/1
1 TABLET ORAL 3 TIMES DAILY
Qty: 30 TABLET | Refills: 0 | Status: SHIPPED | OUTPATIENT
Start: 2017-06-14 | End: 2017-10-17

## 2017-06-14 RX ORDER — DEXTROAMPHETAMINE SACCHARATE, AMPHETAMINE ASPARTATE, DEXTROAMPHETAMINE SULFATE AND AMPHETAMINE SULFATE 2.5; 2.5; 2.5; 2.5 MG/1; MG/1; MG/1; MG/1
1 TABLET ORAL 3 TIMES DAILY
Qty: 90 TABLET | Refills: 0 | Status: SHIPPED | OUTPATIENT
Start: 2017-06-14 | End: 2017-10-17

## 2017-06-14 NOTE — PROGRESS NOTES
Answers for HPI/ROS submitted by the patient on 6/12/2017   activity change: No  unexpected weight change: No  neck pain: No  hearing loss: No  rhinorrhea: Yes  trouble swallowing: No  eye discharge: No  visual disturbance: No  chest tightness: No  wheezing: No  chest pain: No  palpitations: No  blood in stool: No  constipation: No  vomiting: No  diarrhea: No  polydipsia: No  polyuria: No  difficulty urinating: No  hematuria: No  menstrual problem: Yes  dysuria: No  joint swelling: No  arthralgias: No  headaches: Yes  weakness: No  confusion: No  dysphoric mood: No  CHIEF COMPLAINT:  Medication check in a patient with ADD    HISTORY OF PRESENT ILLNESS: The patient is a generally healthy 36 year-old white female.  She has a long history of ADD for which she previously took Adderall.  Blood pressure weight and pulse are stable    REVIEW OF SYSTEMS:  GENERAL: No fever, chills, fatigability or weight loss.  SKIN: No rashes, itching or changes in color or texture of skin.  HEAD: No headaches or recent head trauma.  EYES: Visual acuity fine. No photophobia, ocular pain or diplopia.  EARS: Denies ear pain, discharge or vertigo.  NOSE: No loss of smell, no epistaxis.  MOUTH & THROAT: No hoarseness or change in voice. No excessive gum bleeding.  NODES: Denies swollen glands.  CHEST: Denies ALFARO, cyanosis, wheezing.  CARDIOVASCULAR: Denies chest pain, PND, orthopnea or reduced exercise tolerance.  ABDOMEN: Appetite fine. No weight loss. Denies diarrhea, abdominal pain, hematemesis or blood in stool.  URINARY: No flank pain, dysuria or hematuria.  PERIPHERAL VASCULAR: No claudication or cyanosis.  MUSCULOSKELETAL: No joint stiffness or swelling.   NEUROLOGIC: No history of seizures, paralysis, alteration of gait or coordination.    SOCIAL HISTORY: The patient does not smoke.  The patient consumes alcohol socially.  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    PHYSICAL EXAMINATION:   Blood pressure 122/76,  "pulse 75, height 5' 4" (1.626 m), weight 78.6 kg (173 lb 4.5 oz), unknown if currently breastfeeding.  APPEARANCE: Well nourished, well developed, in no acute distress.    HEAD: Normocephalic, atraumatic.  EYES: PERRL. EOMI.  Conjunctivae without injection and  anicteric  EARS: TM's intact. Light reflex normal. No retraction or perforation.    NOSE: Mucosa pink. Airway clear.  MOUTH & THROAT: No tonsillar enlargement. No pharyngeal erythema or exudate. No stridor.  NECK: Supple.   NODES: No cervical, axillary or inguinal lymph node enlargement.  CHEST: Lungs clear to auscultation.  No retractions are noted.  No rales or rhonchi are present.  CARDIOVASCULAR: Normal S1, S2. No rubs, murmurs or gallops.  ABDOMEN: Bowel sounds normal. Not distended. Soft. No tenderness or masses.  No ascites is noted.  MUSCULOSKELETAL:  There is no clubbing, cyanosis, or edema of the extremities x4.  There is full range of motion of the lumbar spine.  There is full range of motion of the extremities x4.  There is no deformity noted.    NEUROLOGIC:       Normal speech development.      Hearing normal.      Slightly antalgic gait.      Motor and sensory exams grossly normal.      DTR's normal.  PSYCHIATRIC: Patient is alert and oriented x3.  Thought processes are all normal.  There is no homicidality.  There is no suicidality.  There is no evidence of psychosis.    LABORATORY/RADIOLOGY:   Chart reviewed.      ASSESSMENT:   ADD     PLAN:  Adderall 10 mg by mouth tid  Return to clinic in 3 months    Answers for HPI/ROS submitted by the patient on 6/12/2017   activity change: No  unexpected weight change: No  neck pain: No  hearing loss: No  rhinorrhea: Yes  trouble swallowing: No  eye discharge: No  visual disturbance: No  chest tightness: No  wheezing: No  chest pain: No  palpitations: No  blood in stool: No  constipation: No  vomiting: No  diarrhea: No  polydipsia: No  polyuria: No  difficulty urinating: No  hematuria: No  menstrual " problem: Yes  dysuria: No  joint swelling: No  arthralgias: No  headaches: Yes  weakness: No  confusion: No  dysphoric mood: No

## 2017-07-31 ENCOUNTER — PATIENT MESSAGE (OUTPATIENT)
Dept: OBSTETRICS AND GYNECOLOGY | Facility: CLINIC | Age: 37
End: 2017-07-31

## 2017-10-17 ENCOUNTER — OFFICE VISIT (OUTPATIENT)
Dept: INTERNAL MEDICINE | Facility: CLINIC | Age: 37
End: 2017-10-17
Attending: FAMILY MEDICINE
Payer: COMMERCIAL

## 2017-10-17 VITALS
HEIGHT: 64 IN | HEART RATE: 80 BPM | SYSTOLIC BLOOD PRESSURE: 112 MMHG | WEIGHT: 181.88 LBS | BODY MASS INDEX: 31.05 KG/M2 | OXYGEN SATURATION: 97 % | DIASTOLIC BLOOD PRESSURE: 76 MMHG

## 2017-10-17 DIAGNOSIS — F98.8 ATTENTION DEFICIT DISORDER (ADD) WITHOUT HYPERACTIVITY: Primary | ICD-10-CM

## 2017-10-17 DIAGNOSIS — M54.50 ACUTE BILATERAL LOW BACK PAIN WITHOUT SCIATICA: ICD-10-CM

## 2017-10-17 PROCEDURE — 99214 OFFICE O/P EST MOD 30 MIN: CPT | Mod: S$GLB,,, | Performed by: FAMILY MEDICINE

## 2017-10-17 PROCEDURE — 99999 PR PBB SHADOW E&M-EST. PATIENT-LVL III: CPT | Mod: PBBFAC,,, | Performed by: FAMILY MEDICINE

## 2017-10-17 RX ORDER — DEXTROAMPHETAMINE SACCHARATE, AMPHETAMINE ASPARTATE, DEXTROAMPHETAMINE SULFATE AND AMPHETAMINE SULFATE 2.5; 2.5; 2.5; 2.5 MG/1; MG/1; MG/1; MG/1
1 TABLET ORAL 3 TIMES DAILY
Qty: 90 TABLET | Refills: 0 | Status: SHIPPED | OUTPATIENT
Start: 2017-12-13 | End: 2018-02-26

## 2017-10-17 RX ORDER — NAPROXEN 500 MG/1
500 TABLET ORAL 2 TIMES DAILY WITH MEALS
Qty: 60 TABLET | Refills: 0 | Status: SHIPPED | OUTPATIENT
Start: 2017-10-17 | End: 2023-02-14

## 2017-10-17 RX ORDER — DEXTROAMPHETAMINE SACCHARATE, AMPHETAMINE ASPARTATE, DEXTROAMPHETAMINE SULFATE AND AMPHETAMINE SULFATE 2.5; 2.5; 2.5; 2.5 MG/1; MG/1; MG/1; MG/1
1 TABLET ORAL 3 TIMES DAILY
Qty: 90 TABLET | Refills: 0 | Status: SHIPPED | OUTPATIENT
Start: 2017-10-17 | End: 2018-02-26

## 2017-10-17 RX ORDER — DEXTROAMPHETAMINE SACCHARATE, AMPHETAMINE ASPARTATE, DEXTROAMPHETAMINE SULFATE AND AMPHETAMINE SULFATE 2.5; 2.5; 2.5; 2.5 MG/1; MG/1; MG/1; MG/1
1 TABLET ORAL 3 TIMES DAILY
Qty: 90 TABLET | Refills: 0 | Status: SHIPPED | OUTPATIENT
Start: 2017-11-15 | End: 2018-02-26

## 2017-10-17 RX ORDER — MOXIFLOXACIN 5 MG/ML
SOLUTION/ DROPS OPHTHALMIC
COMMUNITY
Start: 2017-10-15 | End: 2017-12-05

## 2017-10-17 RX ORDER — CYCLOBENZAPRINE HCL 10 MG
10 TABLET ORAL 3 TIMES DAILY PRN
Qty: 21 TABLET | Refills: 0 | Status: SHIPPED | OUTPATIENT
Start: 2017-10-17 | End: 2017-10-27

## 2017-10-17 NOTE — PROGRESS NOTES
"CHIEF COMPLAINT:  Medication check in a patient with ADD and acute LBP    HISTORY OF PRESENT ILLNESS: The patient is a generally healthy 36 year-old white female.  She has a long history of ADD for which she previously took Adderall.  Blood pressure weight and pulse are stable.    She sneezed the other day and had the acute onset of low back pain.  No radicular or myelopathic findings.  Over-the-counter medications are not helping.  We will try some naproxen and Flexeril.    REVIEW OF SYSTEMS:  GENERAL: No fever, chills, fatigability or weight loss.  SKIN: No rashes, itching or changes in color or texture of skin.  HEAD: No headaches or recent head trauma.  EYES: Visual acuity fine. No photophobia, ocular pain or diplopia.  EARS: Denies ear pain, discharge or vertigo.  NOSE: No loss of smell, no epistaxis.  MOUTH & THROAT: No hoarseness or change in voice. No excessive gum bleeding.  NODES: Denies swollen glands.  CHEST: Denies ALFARO, cyanosis, wheezing.  CARDIOVASCULAR: Denies chest pain, PND, orthopnea or reduced exercise tolerance.  ABDOMEN: Appetite fine. No weight loss. Denies diarrhea, abdominal pain, hematemesis or blood in stool.  URINARY: No flank pain, dysuria or hematuria.  PERIPHERAL VASCULAR: No claudication or cyanosis.  MUSCULOSKELETAL: No joint stiffness or swelling.   NEUROLOGIC: No history of seizures, paralysis, alteration of gait or coordination.    SOCIAL HISTORY: The patient does not smoke.  The patient consumes alcohol socially.  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    PHYSICAL EXAMINATION:   Blood pressure 112/76, pulse 80, height 5' 4" (1.626 m), weight 82.5 kg (181 lb 14.1 oz), SpO2 97 %, unknown if currently breastfeeding.  APPEARANCE: Well nourished, well developed, in no acute distress.    HEAD: Normocephalic, atraumatic.  EYES: PERRL. EOMI.  Conjunctivae without injection and  anicteric  EARS: TM's intact. Light reflex normal. No retraction or perforation.  "   NOSE: Mucosa pink. Airway clear.  MOUTH & THROAT: No tonsillar enlargement. No pharyngeal erythema or exudate. No stridor.  NECK: Supple.   NODES: No cervical, axillary or inguinal lymph node enlargement.  CHEST: Lungs clear to auscultation.  No retractions are noted.  No rales or rhonchi are present.  CARDIOVASCULAR: Normal S1, S2. No rubs, murmurs or gallops.  ABDOMEN: Bowel sounds normal. Not distended. Soft. No tenderness or masses.  No ascites is noted.  MUSCULOSKELETAL:  There is no clubbing, cyanosis, or edema of the extremities x4.  There is full range of motion of the lumbar spine.  There is full range of motion of the extremities x4.  There is no deformity noted.    NEUROLOGIC:       Normal speech development.      Hearing normal.      Slightly antalgic gait.      Motor and sensory exams grossly normal.      DTR's normal.  PSYCHIATRIC: Patient is alert and oriented x3.  Thought processes are all normal.  There is no homicidality.  There is no suicidality.  There is no evidence of psychosis.    LABORATORY/RADIOLOGY:   Chart reviewed.      ASSESSMENT:   ADD   Acute musculoskeletal low back pain      PLAN:  Adderall 10 mg by mouth tid  Naprosyn and Flexeril   Call with failure to improve   Return to clinic in 3 months      Answers for HPI/ROS submitted by the patient on 10/15/2017   activity change: No  unexpected weight change: No  neck pain: No  hearing loss: No  rhinorrhea: Yes  trouble swallowing: No  eye discharge: Yes  visual disturbance: No  chest tightness: No  wheezing: No  chest pain: No  palpitations: No  blood in stool: No  constipation: No  vomiting: No  diarrhea: No  polydipsia: No  polyuria: No  difficulty urinating: No  hematuria: No  menstrual problem: No  dysuria: No  joint swelling: No  arthralgias: Yes  headaches: Yes  weakness: No  confusion: No  dysphoric mood: No

## 2017-11-01 ENCOUNTER — OFFICE VISIT (OUTPATIENT)
Dept: INTERNAL MEDICINE | Facility: CLINIC | Age: 37
End: 2017-11-01
Attending: FAMILY MEDICINE
Payer: COMMERCIAL

## 2017-11-01 VITALS
TEMPERATURE: 98 F | SYSTOLIC BLOOD PRESSURE: 102 MMHG | DIASTOLIC BLOOD PRESSURE: 78 MMHG | HEART RATE: 67 BPM | BODY MASS INDEX: 31.37 KG/M2 | WEIGHT: 182.75 LBS

## 2017-11-01 DIAGNOSIS — R11.0 NAUSEA: Primary | ICD-10-CM

## 2017-11-01 PROCEDURE — 99999 PR PBB SHADOW E&M-EST. PATIENT-LVL II: CPT | Mod: PBBFAC,,, | Performed by: FAMILY MEDICINE

## 2017-11-01 PROCEDURE — 99213 OFFICE O/P EST LOW 20 MIN: CPT | Mod: S$GLB,,, | Performed by: FAMILY MEDICINE

## 2017-11-01 NOTE — PROGRESS NOTES
Subjective:       Patient ID: Patrizia Bullock is a 37 y.o. female.    Chief Complaint: Stomach Virus; Dizziness; Headache; and Nausea    Pt presents today for stomach pains that started 4 days ago. Per pt, unclear if it was something she had eaten or a viral infection. Pt vomited on day 1, since then vague abdomen symptoms with some diarrhea along with mild nausea. Pt has been on Nexium for over 10 years without ever having scope or seeing GI. Does think it could also be worsening of her reflux, which has increased in past few weeks  Vomiting has stopped and nausea now intermittent with mild pain since onset per pt  Does have HA's but does think it could be hydration issues      Dizziness:    Associated symptoms: headaches and nausea.no vomiting.  Headache    Associated symptoms include abdominal pain, dizziness and nausea. Pertinent negatives include no vomiting.   Nausea   Associated symptoms include abdominal pain, headaches and nausea. Pertinent negatives include no vomiting.     Review of Systems   Constitutional: Positive for appetite change.   Gastrointestinal: Positive for abdominal pain and nausea. Negative for abdominal distention, anal bleeding, blood in stool, constipation, diarrhea, rectal pain and vomiting.   Neurological: Positive for dizziness and headaches.   All other systems reviewed and are negative.      Objective:      Physical Exam   Constitutional: She is oriented to person, place, and time. She appears well-developed and well-nourished.   HENT:   Head: Normocephalic and atraumatic.   Eyes: Conjunctivae and EOM are normal. Pupils are equal, round, and reactive to light.   Neck: Normal range of motion. Neck supple. No thyromegaly present.   Cardiovascular: Normal rate, regular rhythm, normal heart sounds and intact distal pulses.    No murmur heard.  Pulmonary/Chest: Effort normal and breath sounds normal. No respiratory distress. She has no wheezes. She has no rales. She exhibits no  tenderness.   Abdominal: Soft. Bowel sounds are normal. She exhibits no distension and no mass. There is tenderness (pos TTP along mid epi region). There is no rebound and no guarding. No hernia.   Musculoskeletal: Normal range of motion. She exhibits no edema.   Lymphadenopathy:     She has no cervical adenopathy.   Neurological: She is alert and oriented to person, place, and time.   Skin: Skin is warm. No erythema.   Psychiatric: She has a normal mood and affect. Her behavior is normal. Judgment and thought content normal.       Assessment:       1. Nausea        Plan:       Suspect that pt has nausea from reflux as well as possibility of viralgastro  Encouraged pt to rest, hydrate and see GI for assessment of long term use of PPI without eval of gastritis.

## 2017-12-05 ENCOUNTER — OFFICE VISIT (OUTPATIENT)
Dept: INTERNAL MEDICINE | Facility: CLINIC | Age: 37
End: 2017-12-05
Attending: FAMILY MEDICINE
Payer: COMMERCIAL

## 2017-12-05 VITALS
WEIGHT: 182.31 LBS | HEART RATE: 68 BPM | SYSTOLIC BLOOD PRESSURE: 100 MMHG | BODY MASS INDEX: 31.12 KG/M2 | DIASTOLIC BLOOD PRESSURE: 70 MMHG | HEIGHT: 64 IN

## 2017-12-05 DIAGNOSIS — R06.02 SOB (SHORTNESS OF BREATH): ICD-10-CM

## 2017-12-05 DIAGNOSIS — F98.8 ATTENTION DEFICIT DISORDER (ADD) WITHOUT HYPERACTIVITY: ICD-10-CM

## 2017-12-05 DIAGNOSIS — J18.9 COMMUNITY ACQUIRED PNEUMONIA, UNSPECIFIED LATERALITY: Primary | ICD-10-CM

## 2017-12-05 PROCEDURE — 99999 PR PBB SHADOW E&M-EST. PATIENT-LVL III: CPT | Mod: PBBFAC,,, | Performed by: FAMILY MEDICINE

## 2017-12-05 PROCEDURE — 96372 THER/PROPH/DIAG INJ SC/IM: CPT | Mod: S$GLB,,, | Performed by: FAMILY MEDICINE

## 2017-12-05 PROCEDURE — 99214 OFFICE O/P EST MOD 30 MIN: CPT | Mod: 25,S$GLB,, | Performed by: FAMILY MEDICINE

## 2017-12-05 RX ORDER — PROMETHAZINE HYDROCHLORIDE AND CODEINE PHOSPHATE 6.25; 1 MG/5ML; MG/5ML
5 SOLUTION ORAL EVERY 4 HOURS PRN
Qty: 180 ML | Refills: 0 | Status: SHIPPED | OUTPATIENT
Start: 2017-12-05 | End: 2017-12-15

## 2017-12-05 RX ORDER — TRIAMCINOLONE ACETONIDE 40 MG/ML
40 INJECTION, SUSPENSION INTRA-ARTICULAR; INTRAMUSCULAR
Status: COMPLETED | OUTPATIENT
Start: 2017-12-05 | End: 2017-12-05

## 2017-12-05 RX ORDER — ALBUTEROL SULFATE 90 UG/1
2 AEROSOL, METERED RESPIRATORY (INHALATION) EVERY 6 HOURS PRN
Qty: 1 EACH | Refills: 11 | Status: SHIPPED | OUTPATIENT
Start: 2017-12-05 | End: 2023-02-14

## 2017-12-05 RX ORDER — LEVOFLOXACIN 500 MG/1
500 TABLET, FILM COATED ORAL DAILY
Qty: 10 TABLET | Refills: 0 | Status: SHIPPED | OUTPATIENT
Start: 2017-12-05 | End: 2017-12-15

## 2017-12-05 RX ADMIN — TRIAMCINOLONE ACETONIDE 40 MG: 40 INJECTION, SUSPENSION INTRA-ARTICULAR; INTRAMUSCULAR at 10:12

## 2017-12-05 NOTE — PROGRESS NOTES
"CHIEF COMPLAINT:  Productive cough fever and chills in a patient with ADD     HISTORY OF PRESENT ILLNESS: The patient is a generally healthy 37 year-old white female.  She has a long history of ADD for which she previously took Adderall.  Blood pressure weight and pulse are stable.    She has a several day history of productive cough fever and chills.  She does not feel good at all.  She is somewhat short of breath.  I suspect she has community-acquired pneumonia.  We will treat her aggressively.    REVIEW OF SYSTEMS:  GENERAL: No fever, chills, fatigability or weight loss.  SKIN: No rashes, itching or changes in color or texture of skin.  HEAD: No headaches or recent head trauma.  EYES: Visual acuity fine. No photophobia, ocular pain or diplopia.  EARS: Denies ear pain, discharge or vertigo.  NOSE: No loss of smell, no epistaxis.  MOUTH & THROAT: No hoarseness or change in voice. No excessive gum bleeding.  NODES: Denies swollen glands.  CHEST: Denies ALFARO, cyanosis, wheezing.  CARDIOVASCULAR: Denies chest pain, PND, orthopnea or reduced exercise tolerance.  ABDOMEN: Appetite fine. No weight loss. Denies diarrhea, abdominal pain, hematemesis or blood in stool.  URINARY: No flank pain, dysuria or hematuria.  PERIPHERAL VASCULAR: No claudication or cyanosis.  MUSCULOSKELETAL: No joint stiffness or swelling.   NEUROLOGIC: No history of seizures, paralysis, alteration of gait or coordination.    SOCIAL HISTORY: The patient does not smoke.  The patient consumes alcohol socially.  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    PHYSICAL EXAMINATION:   Blood pressure 100/70, pulse 68, height 5' 4" (1.626 m), weight 82.7 kg (182 lb 5.1 oz), last menstrual period 11/28/2017, not currently breastfeeding.  APPEARANCE: Well nourished, well developed, in no acute distress.    HEAD: Normocephalic, atraumatic.  EYES: " PERRL. EOMI.  Conjunctivae without injection and  anicteric  EARS: TM's intact. Light reflex normal. No retraction or perforation.    NOSE: Mucosa pink. Airway clear.  MOUTH & THROAT: No tonsillar enlargement. No pharyngeal erythema or exudate. No stridor.  NECK: Supple.   NODES: No cervical, axillary or inguinal lymph node enlargement.  CHEST: Lungs clear to auscultation.  No retractions are noted.  No rales or rhonchi are present.  CARDIOVASCULAR: Normal S1, S2. No rubs, murmurs or gallops.  ABDOMEN: Bowel sounds normal. Not distended. Soft. No tenderness or masses.  No ascites is noted.  MUSCULOSKELETAL:  There is no clubbing, cyanosis, or edema of the extremities x4.  There is full range of motion of the lumbar spine.  There is full range of motion of the extremities x4.  There is no deformity noted.    NEUROLOGIC:       Normal speech development.      Hearing normal.      Slightly antalgic gait.      Motor and sensory exams grossly normal.      DTR's normal.  PSYCHIATRIC: Patient is alert and oriented x3.  Thought processes are all normal.  There is no homicidality.  There is no suicidality.  There is no evidence of psychosis.    LABORATORY/RADIOLOGY:   Chart reviewed.      ASSESSMENT:   CAP  ADD     PLAN:  A Kenalog injection was given in the clinic today.  I discussed the risks and benefits of steroid injection with the patient.  The risks include liponecrosis, exacerbation of diabetes, specifically elevated blood sugars, adrenal suppression, and markedly elevated mood and energy.  The patient is aware of and accepts these risks.  10 day course of Levaquin   Albuterol inhaler   Phenergan With Codeine cough syrup   Adderall 10 mg by mouth tid  Naprosyn and Flexeril   Call with failure to improve   Return to clinic in 3 months

## 2017-12-05 NOTE — PROGRESS NOTES
Patient given kenalog 40mg IM in the [facility]. Patient tolerated well and Band-Aid was applied. Lot#[id number] Exp:2/2019. Patient advised to wait in the lobby for 15 min to make sure no adverse reactions occur. Patient states verbal understanding and has no further questions.

## 2018-01-08 ENCOUNTER — PATIENT MESSAGE (OUTPATIENT)
Dept: INTERNAL MEDICINE | Facility: CLINIC | Age: 38
End: 2018-01-08

## 2018-01-08 ENCOUNTER — OFFICE VISIT (OUTPATIENT)
Dept: OPTOMETRY | Facility: CLINIC | Age: 38
End: 2018-01-08
Payer: COMMERCIAL

## 2018-01-08 DIAGNOSIS — S05.02XA ABRASION OF LEFT CORNEA, INITIAL ENCOUNTER: Primary | ICD-10-CM

## 2018-01-08 PROCEDURE — 92012 INTRM OPH EXAM EST PATIENT: CPT | Mod: S$GLB,,, | Performed by: OPTOMETRIST

## 2018-01-08 PROCEDURE — 99999 PR PBB SHADOW E&M-EST. PATIENT-LVL II: CPT | Mod: PBBFAC,,, | Performed by: OPTOMETRIST

## 2018-01-08 RX ORDER — CIPROFLOXACIN HYDROCHLORIDE 3 MG/ML
1 SOLUTION/ DROPS OPHTHALMIC 4 TIMES DAILY
Qty: 5 ML | Refills: 0 | Status: SHIPPED | OUTPATIENT
Start: 2018-01-08 | End: 2018-01-15

## 2018-01-08 RX ORDER — CIPROFLOXACIN HYDROCHLORIDE 3 MG/ML
1 SOLUTION/ DROPS OPHTHALMIC 4 TIMES DAILY
Qty: 5 ML | Refills: 0 | OUTPATIENT
Start: 2018-01-08 | End: 2018-01-08 | Stop reason: SDUPTHER

## 2018-01-08 NOTE — PROGRESS NOTES
HPI     Left eye x 1 day some mucous and blur]  Tried OTC drops with no help  Last wore daily contacts yesterday    Last edited by Gregory Elise, OD on 1/8/2018  1:17 PM. (History)            Assessment /Plan     For exam results, see Encounter Report.    Abrasion of left cornea, initial encounter  -     Discontinue: ciprofloxacin HCl (CILOXAN) 0.3 % ophthalmic solution; Place 1 drop into the left eye 4 (four) times daily. .  Dispense: 5 mL; Refill: 0  -     ciprofloxacin HCl (CILOXAN) 0.3 % ophthalmic solution; Place 1 drop into the left eye 4 (four) times daily. .  Dispense: 5 mL; Refill: 0      1. Start Ciloxan drops 1 drop 4x/day x 1 week, no contacts. RTC 1 week follow up if no better.

## 2018-01-11 ENCOUNTER — TELEPHONE (OUTPATIENT)
Dept: INTERNAL MEDICINE | Facility: CLINIC | Age: 38
End: 2018-01-11

## 2018-01-11 ENCOUNTER — PATIENT MESSAGE (OUTPATIENT)
Dept: INTERNAL MEDICINE | Facility: CLINIC | Age: 38
End: 2018-01-11

## 2018-01-11 NOTE — TELEPHONE ENCOUNTER
PA initated and approved by [employer] for generic adderall. PA approval#[id number] for 36months. No further intervention needed.

## 2018-02-01 ENCOUNTER — PATIENT OUTREACH (OUTPATIENT)
Dept: INTERNAL MEDICINE | Facility: CLINIC | Age: 38
End: 2018-02-01

## 2018-02-01 NOTE — PROGRESS NOTES
[facility] is committed to your overall health.  To help you get the most out of each of your visits, we will review your information to make sure you are up to date on all of your recommended tests and/or procedures.       Your PCP  Prince Horton MD   found that you may be due for:       Health Maintenance Due   Topic Date Due    Influenza Vaccine  08/01/2017             If you have had any of the above done at another facility, please bring the records or information with you so that your record at [facility] will be complete.  If you would like to schedule any of these, please contact me.     If you are currently taking medication, please bring it with you to your appointment for review.     Also, if you have any type of Advanced Directives, please bring them with you to your office visit so we may scan them into your chart.       Thank you for Choosing [facility] for your healthcare needs.        Additional Information  If you have questions, you can email [email or web address] or call [phone]  to talk to our [facility] staff. Remember, [facility] is NOT to be used for urgent needs. For medical emergencies, dial 911.

## 2018-02-06 ENCOUNTER — PATIENT MESSAGE (OUTPATIENT)
Dept: INTERNAL MEDICINE | Facility: CLINIC | Age: 38
End: 2018-02-06

## 2018-02-09 ENCOUNTER — PATIENT OUTREACH (OUTPATIENT)
Dept: INTERNAL MEDICINE | Facility: CLINIC | Age: 38
End: 2018-02-09

## 2018-02-09 NOTE — PROGRESS NOTES
[facility] is committed to your overall health.  To help you get the most out of each of your visits, we will review your information to make sure you are up to date on all of your recommended tests and/or procedures.       Your PCP  Prince Horton MD   found that you may be due for:       Health Maintenance Due   Topic Date Due    Influenza Vaccine  08/01/2017             If you have had any of the above done at another facility, please bring the records or information with you so that your record at [facility] will be complete.  If you would like to schedule any of these, please contact me.     If you are currently taking medication, please bring it with you to your appointment for review.     Also, if you have any type of Advanced Directives, please bring them with you to your office visit so we may scan them into your chart.       Thank you for Choosing [facility] for your healthcare needs.        Additional Information  If you have questions, you can email [email or web address] or call [phone]  to talk to our [facility] staff. Remember, [facility] is NOT to be used for urgent needs. For medical emergencies, dial 911.

## 2018-02-26 ENCOUNTER — OFFICE VISIT (OUTPATIENT)
Dept: INTERNAL MEDICINE | Facility: CLINIC | Age: 38
End: 2018-02-26
Attending: FAMILY MEDICINE
Payer: COMMERCIAL

## 2018-02-26 VITALS
WEIGHT: 177.94 LBS | DIASTOLIC BLOOD PRESSURE: 74 MMHG | HEIGHT: 64 IN | HEART RATE: 75 BPM | SYSTOLIC BLOOD PRESSURE: 96 MMHG | OXYGEN SATURATION: 96 % | BODY MASS INDEX: 30.38 KG/M2

## 2018-02-26 DIAGNOSIS — Z00.00 ANNUAL PHYSICAL EXAM: Primary | ICD-10-CM

## 2018-02-26 DIAGNOSIS — F98.8 ATTENTION DEFICIT DISORDER (ADD) WITHOUT HYPERACTIVITY: ICD-10-CM

## 2018-02-26 DIAGNOSIS — H01.004 BLEPHARITIS OF LEFT UPPER EYELID, UNSPECIFIED TYPE: ICD-10-CM

## 2018-02-26 PROCEDURE — 99395 PREV VISIT EST AGE 18-39: CPT | Mod: S$GLB,,, | Performed by: FAMILY MEDICINE

## 2018-02-26 PROCEDURE — 99999 PR PBB SHADOW E&M-EST. PATIENT-LVL III: CPT | Mod: PBBFAC,,, | Performed by: FAMILY MEDICINE

## 2018-02-26 RX ORDER — DEXTROAMPHETAMINE SACCHARATE, AMPHETAMINE ASPARTATE, DEXTROAMPHETAMINE SULFATE AND AMPHETAMINE SULFATE 2.5; 2.5; 2.5; 2.5 MG/1; MG/1; MG/1; MG/1
1 TABLET ORAL 2 TIMES DAILY
Qty: 60 TABLET | Refills: 0 | Status: SHIPPED | OUTPATIENT
Start: 2018-02-26 | End: 2018-05-24

## 2018-02-26 RX ORDER — DEXTROAMPHETAMINE SACCHARATE, AMPHETAMINE ASPARTATE, DEXTROAMPHETAMINE SULFATE AND AMPHETAMINE SULFATE 2.5; 2.5; 2.5; 2.5 MG/1; MG/1; MG/1; MG/1
1 TABLET ORAL 2 TIMES DAILY
Qty: 60 TABLET | Refills: 0 | Status: SHIPPED | OUTPATIENT
Start: 2018-03-28 | End: 2018-05-24

## 2018-02-26 RX ORDER — DEXTROAMPHETAMINE SACCHARATE, AMPHETAMINE ASPARTATE, DEXTROAMPHETAMINE SULFATE AND AMPHETAMINE SULFATE 2.5; 2.5; 2.5; 2.5 MG/1; MG/1; MG/1; MG/1
1 TABLET ORAL 2 TIMES DAILY
Qty: 60 TABLET | Refills: 0 | Status: SHIPPED | OUTPATIENT
Start: 2018-04-27 | End: 2018-05-24

## 2018-02-26 RX ORDER — VALACYCLOVIR HYDROCHLORIDE 1 G/1
1000 TABLET, FILM COATED ORAL 3 TIMES DAILY
Qty: 21 TABLET | Refills: 0 | Status: SHIPPED | OUTPATIENT
Start: 2018-02-26 | End: 2018-05-24

## 2018-02-27 NOTE — PROGRESS NOTES
"CHIEF COMPLAINT:  Annual in a patient with ADD     HISTORY OF PRESENT ILLNESS: The patient is a generally healthy 37 year-old white female.  She has a long history of ADD for which she previously took Adderall.  Blood pressure weight and pulse are stable.    REVIEW OF SYSTEMS:  GENERAL: No fever, chills, fatigability or weight loss.  SKIN: No rashes, itching or changes in color or texture of skin.  HEAD: No headaches or recent head trauma.  EYES: Visual acuity fine. No photophobia, ocular pain or diplopia.  EARS: Denies ear pain, discharge or vertigo.  NOSE: No loss of smell, no epistaxis.  MOUTH & THROAT: No hoarseness or change in voice. No excessive gum bleeding.  NODES: Denies swollen glands.  CHEST: Denies ALFARO, cyanosis, wheezing.  CARDIOVASCULAR: Denies chest pain, PND, orthopnea or reduced exercise tolerance.  ABDOMEN: Appetite fine. No weight loss. Denies diarrhea, abdominal pain, hematemesis or blood in stool.  URINARY: No flank pain, dysuria or hematuria.  PERIPHERAL VASCULAR: No claudication or cyanosis.  MUSCULOSKELETAL: No joint stiffness or swelling.   NEUROLOGIC: No history of seizures, paralysis, alteration of gait or coordination.    SOCIAL HISTORY: The patient does not smoke.  The patient consumes alcohol socially.  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    PHYSICAL EXAMINATION:   Blood pressure 96/74, pulse 75, height 5' 4" (1.626 m), weight 80.7 kg (177 lb 14.6 oz), SpO2 96 %.  APPEARANCE: Well nourished, well developed, in no acute distress.    HEAD: Normocephalic, atraumatic.  EYES: PERRL. EOMI.  Conjunctivae without injection and  anicteric  EARS: TM's intact. Light reflex normal. No retraction or perforation.    NOSE: Mucosa pink. Airway clear.  MOUTH & THROAT: No tonsillar enlargement. No pharyngeal erythema or exudate. No stridor.  NECK: Supple. "   NODES: No cervical, axillary or inguinal lymph node enlargement.  CHEST: Lungs clear to auscultation.  No retractions are noted.  No rales or rhonchi are present.  CARDIOVASCULAR: Normal S1, S2. No rubs, murmurs or gallops.  ABDOMEN: Bowel sounds normal. Not distended. Soft. No tenderness or masses.  No ascites is noted.  MUSCULOSKELETAL:  There is no clubbing, cyanosis, or edema of the extremities x4.  There is full range of motion of the lumbar spine.  There is full range of motion of the extremities x4.  There is no deformity noted.    NEUROLOGIC:       Normal speech development.      Hearing normal.      Slightly antalgic gait.      Motor and sensory exams grossly normal.      DTR's normal.  PSYCHIATRIC: Patient is alert and oriented x3.  Thought processes are all normal.  There is no homicidality.  There is no suicidality.  There is no evidence of psychosis.    LABORATORY/RADIOLOGY:   Chart reviewed.      ASSESSMENT:   Anual  ADD     PLAN:  Adderall 10 mg by mouth tid  Naprosyn and Flexeril      Return to clinic in 3 months    Answers for HPI/ROS submitted by the patient on 2/25/2018   activity change: No  unexpected weight change: No  neck pain: No  hearing loss: No  rhinorrhea: Yes  trouble swallowing: No  eye discharge: No  visual disturbance: No  chest tightness: No  wheezing: No  chest pain: No  palpitations: No  blood in stool: No  constipation: No  vomiting: No  diarrhea: No  polydipsia: No  polyuria: No  difficulty urinating: No  hematuria: No  menstrual problem: No  dysuria: No  joint swelling: No  arthralgias: Yes  headaches: No  weakness: No  confusion: No  dysphoric mood: No

## 2018-05-24 ENCOUNTER — OFFICE VISIT (OUTPATIENT)
Dept: INTERNAL MEDICINE | Facility: CLINIC | Age: 38
End: 2018-05-24
Attending: FAMILY MEDICINE
Payer: COMMERCIAL

## 2018-05-24 VITALS
TEMPERATURE: 98 F | OXYGEN SATURATION: 98 % | BODY MASS INDEX: 30.22 KG/M2 | DIASTOLIC BLOOD PRESSURE: 70 MMHG | WEIGHT: 177 LBS | HEIGHT: 64 IN | SYSTOLIC BLOOD PRESSURE: 90 MMHG | HEART RATE: 100 BPM

## 2018-05-24 DIAGNOSIS — J01.00 ACUTE MAXILLARY SINUSITIS, RECURRENCE NOT SPECIFIED: ICD-10-CM

## 2018-05-24 DIAGNOSIS — M54.50 ACUTE BILATERAL LOW BACK PAIN WITHOUT SCIATICA: Primary | ICD-10-CM

## 2018-05-24 DIAGNOSIS — J40 BRONCHITIS: ICD-10-CM

## 2018-05-24 PROCEDURE — 99214 OFFICE O/P EST MOD 30 MIN: CPT | Mod: 25,S$GLB,, | Performed by: FAMILY MEDICINE

## 2018-05-24 PROCEDURE — 96372 THER/PROPH/DIAG INJ SC/IM: CPT | Mod: S$GLB,,, | Performed by: FAMILY MEDICINE

## 2018-05-24 PROCEDURE — 99999 PR PBB SHADOW E&M-EST. PATIENT-LVL III: CPT | Mod: PBBFAC,,, | Performed by: FAMILY MEDICINE

## 2018-05-24 RX ORDER — TRIAMCINOLONE ACETONIDE 40 MG/ML
40 INJECTION, SUSPENSION INTRA-ARTICULAR; INTRAMUSCULAR
Status: COMPLETED | OUTPATIENT
Start: 2018-05-24 | End: 2018-05-24

## 2018-05-24 RX ORDER — PROMETHAZINE HYDROCHLORIDE AND CODEINE PHOSPHATE 6.25; 1 MG/5ML; MG/5ML
5 SOLUTION ORAL EVERY 4 HOURS PRN
Qty: 180 ML | Refills: 0 | Status: SHIPPED | OUTPATIENT
Start: 2018-05-24 | End: 2018-06-03

## 2018-05-24 RX ORDER — AMOXICILLIN AND CLAVULANATE POTASSIUM 875; 125 MG/1; MG/1
1 TABLET, FILM COATED ORAL 2 TIMES DAILY
Qty: 20 TABLET | Refills: 0 | Status: SHIPPED | OUTPATIENT
Start: 2018-05-24 | End: 2018-06-03

## 2018-05-24 RX ORDER — HYDROCODONE BITARTRATE AND ACETAMINOPHEN 5; 325 MG/1; MG/1
1 TABLET ORAL EVERY 8 HOURS PRN
Qty: 15 TABLET | Refills: 0 | Status: SHIPPED | OUTPATIENT
Start: 2018-05-24 | End: 2021-03-08

## 2018-05-24 RX ADMIN — TRIAMCINOLONE ACETONIDE 40 MG: 40 INJECTION, SUSPENSION INTRA-ARTICULAR; INTRAMUSCULAR at 03:05

## 2018-05-24 NOTE — PROGRESS NOTES
"CHIEF COMPLAINT:  7 days of sinus congestion, cough fever and chills in a patient with ADD     HISTORY OF PRESENT ILLNESS: The patient is a generally healthy 37 year-old white female.  She has a long history of ADD for which she previously took Adderall.  Blood pressure weight and pulse are stable.    She has a seven day history of sinus congestion and postnasal drip with nonproductive cough fever and chills.  She does not feel good at all.  She is somewhat short of breath.  As a result of all of the coughing she is having low back pain now.  No radicular or myelopathic findings are noted.    REVIEW OF SYSTEMS:  GENERAL: No fatigability or weight loss.  SKIN: No rashes, itching or changes in color or texture of skin.  HEAD: No headaches or recent head trauma.  EYES: Visual acuity fine. No photophobia, ocular pain or diplopia.  EARS: Denies ear pain, discharge or vertigo.  NOSE: No loss of smell, no epistaxis.  MOUTH & THROAT: No hoarseness or change in voice. No excessive gum bleeding.  NODES: Denies swollen glands.  CHEST: Denies ALFARO, cyanosis, wheezing.  CARDIOVASCULAR: Denies chest pain, PND, orthopnea or reduced exercise tolerance.  ABDOMEN: Appetite fine. No weight loss. Denies diarrhea, abdominal pain, hematemesis or blood in stool.  URINARY: No flank pain, dysuria or hematuria.  PERIPHERAL VASCULAR: No claudication or cyanosis.  MUSCULOSKELETAL: No joint stiffness or swelling.   NEUROLOGIC: No history of seizures, paralysis, alteration of gait or coordination.    SOCIAL HISTORY: The patient does not smoke.  The patient consumes alcohol socially.  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    PHYSICAL EXAMINATION:   Blood pressure 90/70, pulse 100, temperature 98.3 °F (36.8 °C), height 5' 4" (1.626 m), weight 80.3 kg (177 lb 0.5 oz), last menstrual period 05/16/2018, SpO2 98 %.  APPEARANCE: Well nourished, " well developed, in no acute distress.    HEAD: Normocephalic, atraumatic.  EYES: PERRL. EOMI.  Conjunctivae without injection and  anicteric  EARS: TM's intact. Light reflex normal. No retraction or perforation.    NOSE: Mucosa pink. Airway clear.  MOUTH & THROAT: No tonsillar enlargement. No pharyngeal erythema or exudate. No stridor.  NECK: Supple.   NODES: No cervical, axillary or inguinal lymph node enlargement.  CHEST: Lungs clear to auscultation.  No retractions are noted.  No rales or rhonchi are present.  CARDIOVASCULAR: Normal S1, S2. No rubs, murmurs or gallops.  ABDOMEN: Bowel sounds normal. Not distended. Soft. No tenderness or masses.  No ascites is noted.  MUSCULOSKELETAL:  There is no clubbing, cyanosis, or edema of the extremities x4.  There is full range of motion of the lumbar spine.  There is full range of motion of the extremities x4.  There is no deformity noted.    NEUROLOGIC:       Normal speech development.      Hearing normal.      Slightly antalgic gait.      Motor and sensory exams grossly normal.      DTR's normal.  PSYCHIATRIC: Patient is alert and oriented x3.  Thought processes are all normal.  There is no homicidality.  There is no suicidality.  There is no evidence of psychosis.    LABORATORY/RADIOLOGY:   Chart reviewed.      ASSESSMENT:   Low back pain  Bronchitis  Cough  ADD     PLAN:  A Kenalog injection was given in the clinic today.  I discussed the risks and benefits of steroid injection with the patient.  The risks include liponecrosis, exacerbation of diabetes, specifically elevated blood sugars, adrenal suppression, and markedly elevated mood and energy.  The patient is aware of and accepts these risks.  10 day course of Augmentin  Albuterol inhaler   Phenergan With Codeine cough syrup   Adderall 10 mg by mouth tid  Norco  Call with failure to improve   Return to clinic in 3 months    Answers for HPI/ROS submitted by the patient on 5/24/2018   activity change: No  unexpected  weight change: No  neck pain: Yes  hearing loss: No  rhinorrhea: Yes  trouble swallowing: No  eye discharge: No  visual disturbance: No  chest tightness: Yes  wheezing: No  chest pain: No  palpitations: No  blood in stool: No  constipation: No  vomiting: No  diarrhea: No  polydipsia: No  polyuria: No  difficulty urinating: No  hematuria: No  menstrual problem: No  dysuria: No  joint swelling: No  arthralgias: Yes  headaches: Yes  weakness: Yes  confusion: No  dysphoric mood: No

## 2018-06-22 ENCOUNTER — OFFICE VISIT (OUTPATIENT)
Dept: INTERNAL MEDICINE | Facility: CLINIC | Age: 38
End: 2018-06-22
Attending: FAMILY MEDICINE
Payer: COMMERCIAL

## 2018-06-22 VITALS
OXYGEN SATURATION: 97 % | SYSTOLIC BLOOD PRESSURE: 102 MMHG | DIASTOLIC BLOOD PRESSURE: 70 MMHG | WEIGHT: 174.81 LBS | HEART RATE: 69 BPM | BODY MASS INDEX: 29.84 KG/M2 | HEIGHT: 64 IN

## 2018-06-22 DIAGNOSIS — F98.8 ATTENTION DEFICIT DISORDER (ADD) WITHOUT HYPERACTIVITY: Primary | ICD-10-CM

## 2018-06-22 PROCEDURE — 99999 PR PBB SHADOW E&M-EST. PATIENT-LVL III: CPT | Mod: PBBFAC,,, | Performed by: FAMILY MEDICINE

## 2018-06-22 PROCEDURE — 99213 OFFICE O/P EST LOW 20 MIN: CPT | Mod: S$GLB,,, | Performed by: FAMILY MEDICINE

## 2018-06-22 RX ORDER — DEXTROAMPHETAMINE SACCHARATE, AMPHETAMINE ASPARTATE, DEXTROAMPHETAMINE SULFATE AND AMPHETAMINE SULFATE 2.5; 2.5; 2.5; 2.5 MG/1; MG/1; MG/1; MG/1
1 TABLET ORAL 3 TIMES DAILY
Qty: 90 TABLET | Refills: 0 | Status: SHIPPED | OUTPATIENT
Start: 2018-06-22 | End: 2018-11-09

## 2018-06-22 RX ORDER — ESTRADIOL 1 MG/1
TABLET ORAL
Refills: 2 | COMMUNITY
Start: 2018-06-13 | End: 2021-03-08

## 2018-06-22 RX ORDER — CLOMIPHENE CITRATE 50 MG/1
TABLET ORAL
Refills: 0 | COMMUNITY
Start: 2018-06-13 | End: 2021-03-08

## 2018-06-22 NOTE — PROGRESS NOTES
"CHIEF COMPLAINT:  Medication check in a patient with ADD    HISTORY OF PRESENT ILLNESS: The patient is a generally healthy 37 year-old white female.  She has a long history of ADD for which she previously took Adderall.  Blood pressure weight and pulse are stable    REVIEW OF SYSTEMS:  GENERAL: No fever, chills, fatigability or weight loss.  SKIN: No rashes, itching or changes in color or texture of skin.  HEAD: No headaches or recent head trauma.  EYES: Visual acuity fine. No photophobia, ocular pain or diplopia.  EARS: Denies ear pain, discharge or vertigo.  NOSE: No loss of smell, no epistaxis.  MOUTH & THROAT: No hoarseness or change in voice. No excessive gum bleeding.  NODES: Denies swollen glands.  CHEST: Denies ALFARO, cyanosis, wheezing.  CARDIOVASCULAR: Denies chest pain, PND, orthopnea or reduced exercise tolerance.  ABDOMEN: Appetite fine. No weight loss. Denies diarrhea, abdominal pain, hematemesis or blood in stool.  URINARY: No flank pain, dysuria or hematuria.  PERIPHERAL VASCULAR: No claudication or cyanosis.  MUSCULOSKELETAL: No joint stiffness or swelling.   NEUROLOGIC: No history of seizures, paralysis, alteration of gait or coordination.    SOCIAL HISTORY: The patient does not smoke.  The patient consumes alcohol socially.  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    PHYSICAL EXAMINATION:   Blood pressure 102/70, pulse 69, height 5' 4" (1.626 m), weight 79.3 kg (174 lb 13.2 oz), SpO2 97 %.  APPEARANCE: Well nourished, well developed, in no acute distress.    HEAD: Normocephalic, atraumatic.  EYES: PERRL. EOMI.  Conjunctivae without injection and  anicteric  EARS: TM's intact. Light reflex normal. No retraction or perforation.    NOSE: Mucosa pink. Airway clear.  MOUTH & THROAT: No tonsillar enlargement. No pharyngeal erythema or exudate. No stridor.  NECK: Supple.   NODES: No cervical, axillary or inguinal lymph node enlargement.  CHEST: Lungs clear to " auscultation.  No retractions are noted.  No rales or rhonchi are present.  CARDIOVASCULAR: Normal S1, S2. No rubs, murmurs or gallops.  ABDOMEN: Bowel sounds normal. Not distended. Soft. No tenderness or masses.  No ascites is noted.  MUSCULOSKELETAL:  There is no clubbing, cyanosis, or edema of the extremities x4.  There is full range of motion of the lumbar spine.  There is full range of motion of the extremities x4.  There is no deformity noted.    NEUROLOGIC:       Normal speech development.      Hearing normal.      Slightly antalgic gait.      Motor and sensory exams grossly normal.      DTR's normal.  PSYCHIATRIC: Patient is alert and oriented x3.  Thought processes are all normal.  There is no homicidality.  There is no suicidality.  There is no evidence of psychosis.    LABORATORY/RADIOLOGY:   Chart reviewed.      ASSESSMENT:   ADD     PLAN:  Adderall 10 mg by mouth tid  Return to clinic in 3 months

## 2018-11-09 ENCOUNTER — OFFICE VISIT (OUTPATIENT)
Dept: INTERNAL MEDICINE | Facility: CLINIC | Age: 38
End: 2018-11-09
Attending: FAMILY MEDICINE
Payer: COMMERCIAL

## 2018-11-09 VITALS
BODY MASS INDEX: 27.1 KG/M2 | OXYGEN SATURATION: 97 % | SYSTOLIC BLOOD PRESSURE: 100 MMHG | HEIGHT: 64 IN | HEART RATE: 75 BPM | WEIGHT: 158.75 LBS | DIASTOLIC BLOOD PRESSURE: 62 MMHG

## 2018-11-09 DIAGNOSIS — J01.00 ACUTE MAXILLARY SINUSITIS, RECURRENCE NOT SPECIFIED: ICD-10-CM

## 2018-11-09 DIAGNOSIS — F98.8 ATTENTION DEFICIT DISORDER (ADD) WITHOUT HYPERACTIVITY: Primary | ICD-10-CM

## 2018-11-09 PROCEDURE — 99214 OFFICE O/P EST MOD 30 MIN: CPT | Mod: 25,S$GLB,, | Performed by: FAMILY MEDICINE

## 2018-11-09 PROCEDURE — 99999 PR PBB SHADOW E&M-EST. PATIENT-LVL IV: CPT | Mod: PBBFAC,,, | Performed by: FAMILY MEDICINE

## 2018-11-09 PROCEDURE — 96372 THER/PROPH/DIAG INJ SC/IM: CPT | Mod: S$GLB,,, | Performed by: FAMILY MEDICINE

## 2018-11-09 RX ORDER — DEXTROAMPHETAMINE SACCHARATE, AMPHETAMINE ASPARTATE, DEXTROAMPHETAMINE SULFATE AND AMPHETAMINE SULFATE 2.5; 2.5; 2.5; 2.5 MG/1; MG/1; MG/1; MG/1
1 TABLET ORAL 3 TIMES DAILY
Qty: 90 TABLET | Refills: 0 | Status: SHIPPED | OUTPATIENT
Start: 2018-11-09 | End: 2019-02-14

## 2018-11-09 RX ORDER — TRIAMCINOLONE ACETONIDE 40 MG/ML
40 INJECTION, SUSPENSION INTRA-ARTICULAR; INTRAMUSCULAR
Status: COMPLETED | OUTPATIENT
Start: 2018-11-09 | End: 2018-11-09

## 2018-11-09 RX ORDER — AMOXICILLIN AND CLAVULANATE POTASSIUM 875; 125 MG/1; MG/1
1 TABLET, FILM COATED ORAL 2 TIMES DAILY
Qty: 20 TABLET | Refills: 0 | Status: SHIPPED | OUTPATIENT
Start: 2018-11-09 | End: 2018-11-19

## 2018-11-09 RX ADMIN — TRIAMCINOLONE ACETONIDE 40 MG: 40 INJECTION, SUSPENSION INTRA-ARTICULAR; INTRAMUSCULAR at 03:11

## 2018-11-09 NOTE — PROGRESS NOTES
"CHIEF COMPLAINT:  Medication check in a patient with ADD and sinus infection    HISTORY OF PRESENT ILLNESS: The patient is a generally healthy 38 year-old white female.  She has a long history of ADD for which she previously took Adderall.  Blood pressure weight and pulse are stable.    For about a week she is having sinus congestion and postnasal drip.    REVIEW OF SYSTEMS:  GENERAL: No fever, chills, fatigability or weight loss.  SKIN: No rashes, itching or changes in color or texture of skin.  HEAD: No headaches or recent head trauma.  EYES: Visual acuity fine. No photophobia, ocular pain or diplopia.  EARS: Denies ear pain, discharge or vertigo.  NOSE: No loss of smell, no epistaxis.  MOUTH & THROAT: No hoarseness or change in voice. No excessive gum bleeding.  NODES: Denies swollen glands.  CHEST: Denies ALFARO, cyanosis, wheezing.  CARDIOVASCULAR: Denies chest pain, PND, orthopnea or reduced exercise tolerance.  ABDOMEN: Appetite fine. No weight loss. Denies diarrhea, abdominal pain, hematemesis or blood in stool.  URINARY: No flank pain, dysuria or hematuria.  PERIPHERAL VASCULAR: No claudication or cyanosis.  MUSCULOSKELETAL: No joint stiffness or swelling.   NEUROLOGIC: No history of seizures, paralysis, alteration of gait or coordination.    SOCIAL HISTORY: The patient does not smoke.  The patient consumes alcohol socially.  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    PHYSICAL EXAMINATION:   Blood pressure 100/62, pulse 75, height 5' 4" (1.626 m), weight 72 kg (158 lb 11.7 oz), SpO2 97 %.  APPEARANCE: Well nourished, well developed, in no acute distress.    HEAD: Normocephalic, atraumatic.  EYES: PERRL. EOMI.  Conjunctivae without injection and  anicteric  EARS: TM's intact. Light reflex normal. No retraction or perforation.    NOSE: Mucosa pink. Airway clear.  MOUTH & THROAT: No tonsillar enlargement. No pharyngeal erythema or exudate. No stridor.  NECK: Supple. "   NODES: No cervical, axillary or inguinal lymph node enlargement.  CHEST: Lungs clear to auscultation.  No retractions are noted.  No rales or rhonchi are present.  CARDIOVASCULAR: Normal S1, S2. No rubs, murmurs or gallops.  ABDOMEN: Bowel sounds normal. Not distended. Soft. No tenderness or masses.  No ascites is noted.  MUSCULOSKELETAL:  There is no clubbing, cyanosis, or edema of the extremities x4.  There is full range of motion of the lumbar spine.  There is full range of motion of the extremities x4.  There is no deformity noted.    NEUROLOGIC:       Normal speech development.      Hearing normal.      Slightly antalgic gait.      Motor and sensory exams grossly normal.      DTR's normal.  PSYCHIATRIC: Patient is alert and oriented x3.  Thought processes are all normal.  There is no homicidality.  There is no suicidality.  There is no evidence of psychosis.    LABORATORY/RADIOLOGY:   Chart reviewed.      ASSESSMENT:   ADD   URI    PLAN:  Adderall 10 mg by mouth tid  A Kenalog injection was given in the clinic today.  I discussed the risks and benefits of steroid injection with the patient.  The risks include liponecrosis, exacerbation of diabetes, specifically elevated blood sugars, adrenal suppression, and markedly elevated mood and energy.  The patient is aware of and accepts these risks.  Augmentin    Return to clinic in 3 months  Answers for HPI/ROS submitted by the patient on 11/7/2018   activity change: No  unexpected weight change: No  neck pain: No  hearing loss: No  rhinorrhea: Yes  trouble swallowing: No  eye discharge: No  visual disturbance: No  chest tightness: No  wheezing: No  chest pain: No  palpitations: No  blood in stool: No  constipation: No  vomiting: No  diarrhea: No  polydipsia: No  polyuria: No  difficulty urinating: No  hematuria: No  menstrual problem: No  dysuria: No  arthralgias: No  headaches: Yes  weakness: No  confusion: No  dysphoric mood: No

## 2019-02-14 ENCOUNTER — OFFICE VISIT (OUTPATIENT)
Dept: INTERNAL MEDICINE | Facility: CLINIC | Age: 39
End: 2019-02-14
Attending: FAMILY MEDICINE
Payer: COMMERCIAL

## 2019-02-14 VITALS
HEART RATE: 86 BPM | HEIGHT: 64 IN | OXYGEN SATURATION: 95 % | DIASTOLIC BLOOD PRESSURE: 64 MMHG | WEIGHT: 154.31 LBS | SYSTOLIC BLOOD PRESSURE: 94 MMHG | BODY MASS INDEX: 26.34 KG/M2

## 2019-02-14 DIAGNOSIS — F98.8 ATTENTION DEFICIT DISORDER (ADD) WITHOUT HYPERACTIVITY: Primary | ICD-10-CM

## 2019-02-14 DIAGNOSIS — Z00.00 ANNUAL PHYSICAL EXAM: ICD-10-CM

## 2019-02-14 PROCEDURE — 99999 PR PBB SHADOW E&M-EST. PATIENT-LVL III: CPT | Mod: PBBFAC,,, | Performed by: FAMILY MEDICINE

## 2019-02-14 PROCEDURE — 99395 PR PREVENTIVE VISIT,EST,18-39: ICD-10-PCS | Mod: S$GLB,,, | Performed by: FAMILY MEDICINE

## 2019-02-14 PROCEDURE — 99999 PR PBB SHADOW E&M-EST. PATIENT-LVL III: ICD-10-PCS | Mod: PBBFAC,,, | Performed by: FAMILY MEDICINE

## 2019-02-14 PROCEDURE — 99395 PREV VISIT EST AGE 18-39: CPT | Mod: S$GLB,,, | Performed by: FAMILY MEDICINE

## 2019-02-14 RX ORDER — DEXTROAMPHETAMINE SACCHARATE, AMPHETAMINE ASPARTATE, DEXTROAMPHETAMINE SULFATE AND AMPHETAMINE SULFATE 3.75; 3.75; 3.75; 3.75 MG/1; MG/1; MG/1; MG/1
15 TABLET ORAL 2 TIMES DAILY
Qty: 60 TABLET | Refills: 0 | Status: SHIPPED | OUTPATIENT
Start: 2019-04-15 | End: 2019-05-30

## 2019-02-14 RX ORDER — DEXTROAMPHETAMINE SACCHARATE, AMPHETAMINE ASPARTATE, DEXTROAMPHETAMINE SULFATE AND AMPHETAMINE SULFATE 3.75; 3.75; 3.75; 3.75 MG/1; MG/1; MG/1; MG/1
15 TABLET ORAL 2 TIMES DAILY
Qty: 60 TABLET | Refills: 0 | Status: SHIPPED | OUTPATIENT
Start: 2019-03-16 | End: 2019-05-30

## 2019-02-14 RX ORDER — DEXTROAMPHETAMINE SACCHARATE, AMPHETAMINE ASPARTATE, DEXTROAMPHETAMINE SULFATE AND AMPHETAMINE SULFATE 3.75; 3.75; 3.75; 3.75 MG/1; MG/1; MG/1; MG/1
15 TABLET ORAL 2 TIMES DAILY
Qty: 60 TABLET | Refills: 0 | Status: SHIPPED | OUTPATIENT
Start: 2019-02-14 | End: 2019-05-30

## 2019-02-14 NOTE — PROGRESS NOTES
"CHIEF COMPLAINT: Annual in a patient with ADD    HISTORY OF PRESENT ILLNESS: The patient is a generally healthy 38 year-old white female.  She has a long history of ADD for which she previously took Adderall.  Blood pressure weight and pulse are stable.    For about a week she is having sinus congestion and postnasal drip.    REVIEW OF SYSTEMS:  GENERAL: No fever, chills, fatigability or weight loss.  SKIN: No rashes, itching or changes in color or texture of skin.  HEAD: No headaches or recent head trauma.  EYES: Visual acuity fine. No photophobia, ocular pain or diplopia.  EARS: Denies ear pain, discharge or vertigo.  NOSE: No loss of smell, no epistaxis.  MOUTH & THROAT: No hoarseness or change in voice. No excessive gum bleeding.  NODES: Denies swollen glands.  CHEST: Denies ALFARO, cyanosis, wheezing.  CARDIOVASCULAR: Denies chest pain, PND, orthopnea or reduced exercise tolerance.  ABDOMEN: Appetite fine. No weight loss. Denies diarrhea, abdominal pain, hematemesis or blood in stool.  URINARY: No flank pain, dysuria or hematuria.  PERIPHERAL VASCULAR: No claudication or cyanosis.  MUSCULOSKELETAL: No joint stiffness or swelling.   NEUROLOGIC: No history of seizures, paralysis, alteration of gait or coordination.    SOCIAL HISTORY: The patient does not smoke.  The patient consumes alcohol socially.  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    PHYSICAL EXAMINATION:   Pulse 86, height 5' 4" (1.626 m), weight 70 kg (154 lb 5.2 oz), SpO2 95 %.  APPEARANCE: Well nourished, well developed, in no acute distress.    HEAD: Normocephalic, atraumatic.  EYES: PERRL. EOMI.  Conjunctivae without injection and  anicteric  EARS: TM's intact. Light reflex normal. No retraction or perforation.    NOSE: Mucosa pink. Airway clear.  MOUTH & THROAT: No tonsillar enlargement. No pharyngeal erythema or exudate. No stridor.  NECK: Supple.   NODES: No cervical, axillary or inguinal lymph node " enlargement.  CHEST: Lungs clear to auscultation.  No retractions are noted.  No rales or rhonchi are present.  CARDIOVASCULAR: Normal S1, S2. No rubs, murmurs or gallops.  ABDOMEN: Bowel sounds normal. Not distended. Soft. No tenderness or masses.  No ascites is noted.  MUSCULOSKELETAL:  There is no clubbing, cyanosis, or edema of the extremities x4.  There is full range of motion of the lumbar spine.  There is full range of motion of the extremities x4.  There is no deformity noted.    NEUROLOGIC:       Normal speech development.      Hearing normal.      Slightly antalgic gait.      Motor and sensory exams grossly normal.      DTR's normal.  PSYCHIATRIC: Patient is alert and oriented x3.  Thought processes are all normal.  There is no homicidality.  There is no suicidality.  There is no evidence of psychosis.    LABORATORY/RADIOLOGY:   Chart reviewed.      ASSESSMENT:   Annual exam   ADD     PLAN:  Adderall 15 mg by mouth bid    Return to clinic in 3 months  Answers for HPI/ROS submitted by the patient on 2/12/2019   activity change: No  unexpected weight change: No  neck pain: Yes  hearing loss: No  rhinorrhea: Yes  trouble swallowing: No  eye discharge: No  visual disturbance: No  chest tightness: No  wheezing: No  chest pain: No  palpitations: No  blood in stool: No  constipation: No  vomiting: No  diarrhea: No  polydipsia: No  polyuria: No  difficulty urinating: No  hematuria: No  menstrual problem: Yes  dysuria: No  joint swelling: No  arthralgias: Yes  headaches: Yes  weakness: No  confusion: No  dysphoric mood: No

## 2019-03-18 ENCOUNTER — PATIENT MESSAGE (OUTPATIENT)
Dept: INTERNAL MEDICINE | Facility: CLINIC | Age: 39
End: 2019-03-18

## 2019-03-20 ENCOUNTER — OFFICE VISIT (OUTPATIENT)
Dept: INTERNAL MEDICINE | Facility: CLINIC | Age: 39
End: 2019-03-20
Attending: FAMILY MEDICINE
Payer: COMMERCIAL

## 2019-03-20 VITALS
HEART RATE: 68 BPM | HEIGHT: 64 IN | BODY MASS INDEX: 26.42 KG/M2 | OXYGEN SATURATION: 97 % | WEIGHT: 154.75 LBS | DIASTOLIC BLOOD PRESSURE: 72 MMHG | SYSTOLIC BLOOD PRESSURE: 112 MMHG

## 2019-03-20 DIAGNOSIS — J01.00 ACUTE MAXILLARY SINUSITIS, RECURRENCE NOT SPECIFIED: ICD-10-CM

## 2019-03-20 DIAGNOSIS — F98.8 ATTENTION DEFICIT DISORDER (ADD) WITHOUT HYPERACTIVITY: Primary | ICD-10-CM

## 2019-03-20 PROCEDURE — 99999 PR PBB SHADOW E&M-EST. PATIENT-LVL III: ICD-10-PCS | Mod: PBBFAC,,, | Performed by: FAMILY MEDICINE

## 2019-03-20 PROCEDURE — 99999 PR PBB SHADOW E&M-EST. PATIENT-LVL III: CPT | Mod: PBBFAC,,, | Performed by: FAMILY MEDICINE

## 2019-03-20 PROCEDURE — 99214 OFFICE O/P EST MOD 30 MIN: CPT | Mod: S$GLB,,, | Performed by: FAMILY MEDICINE

## 2019-03-20 PROCEDURE — 99214 PR OFFICE/OUTPT VISIT, EST, LEVL IV, 30-39 MIN: ICD-10-PCS | Mod: S$GLB,,, | Performed by: FAMILY MEDICINE

## 2019-03-20 RX ORDER — METHYLPREDNISOLONE 4 MG/1
TABLET ORAL
Qty: 21 TABLET | Refills: 0 | Status: SHIPPED | OUTPATIENT
Start: 2019-03-20 | End: 2019-09-04

## 2019-03-20 RX ORDER — CEFDINIR 300 MG/1
600 CAPSULE ORAL DAILY
Qty: 20 CAPSULE | Refills: 0 | Status: SHIPPED | OUTPATIENT
Start: 2019-03-20 | End: 2019-03-30

## 2019-03-20 NOTE — PROGRESS NOTES
"CHIEF COMPLAINT:  ADD and sinus infection    HISTORY OF PRESENT ILLNESS: The patient is a generally healthy 38 year-old white female.  She has been seen twice in Urgent Care at the beginning of February and at the beginning of March.  Both time she received steroids and antibiotics.  She has not had any sustained improvement.  She continues to have a productive cough and postnasal drip.  She feels symptoms are now progressing into her chest.    She has a long history of ADD for which she previously took Adderall.  Blood pressure weight and pulse are stable.    REVIEW OF SYSTEMS:  GENERAL: No fever, chills, fatigability or weight loss.  SKIN: No rashes, itching or changes in color or texture of skin.  HEAD: No headaches or recent head trauma.  EYES: Visual acuity fine. No photophobia, ocular pain or diplopia.  EARS: Denies ear pain, discharge or vertigo.  NOSE: No loss of smell, no epistaxis.  MOUTH & THROAT: No hoarseness or change in voice. No excessive gum bleeding.  NODES: Denies swollen glands.  CHEST: Denies ALFARO, cyanosis, wheezing.  CARDIOVASCULAR: Denies chest pain, PND, orthopnea or reduced exercise tolerance.  ABDOMEN: Appetite fine. No weight loss. Denies diarrhea, abdominal pain, hematemesis or blood in stool.  URINARY: No flank pain, dysuria or hematuria.  PERIPHERAL VASCULAR: No claudication or cyanosis.  MUSCULOSKELETAL: No joint stiffness or swelling.   NEUROLOGIC: No history of seizures, paralysis, alteration of gait or coordination.    SOCIAL HISTORY: The patient does not smoke.  The patient consumes alcohol socially.  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    PHYSICAL EXAMINATION:   Blood pressure 112/72, pulse 68, height 5' 4" (1.626 m), weight 70.2 kg (154 lb 12.2 oz), SpO2 97 %.  APPEARANCE: Well nourished, well developed, in no acute distress.    HEAD: Normocephalic, atraumatic.  EYES: PERRL. EOMI.  Conjunctivae without injection and  " anicteric  EARS: TM's intact. Light reflex normal. No retraction or perforation.    NOSE: Mucosa pink. Airway clear.  MOUTH & THROAT: No tonsillar enlargement. No pharyngeal erythema or exudate. No stridor.  NECK: Supple.   NODES: No cervical, axillary or inguinal lymph node enlargement.  CHEST: Lungs clear to auscultation.  No retractions are noted.  No rales or rhonchi are present.  CARDIOVASCULAR: Normal S1, S2. No rubs, murmurs or gallops.  ABDOMEN: Bowel sounds normal. Not distended. Soft. No tenderness or masses.  No ascites is noted.  MUSCULOSKELETAL:  There is no clubbing, cyanosis, or edema of the extremities x4.  There is full range of motion of the lumbar spine.  There is full range of motion of the extremities x4.  There is no deformity noted.    NEUROLOGIC:       Normal speech development.      Hearing normal.      Slightly antalgic gait.      Motor and sensory exams grossly normal.      DTR's normal.  PSYCHIATRIC: Patient is alert and oriented x3.  Thought processes are all normal.  There is no homicidality.  There is no suicidality.  There is no evidence of psychosis.    LABORATORY/RADIOLOGY:   Chart reviewed.      ASSESSMENT:   ADD   URI    PLAN:  Adderall 10 mg by mouth tid  Cefdinir  Medrol Dosepak    Return to clinic in 3 months  Answers for HPI/ROS submitted by the patient on 3/18/2019   activity change: No  unexpected weight change: No  neck pain: No  hearing loss: No  rhinorrhea: Yes  trouble swallowing: No  eye discharge: No  visual disturbance: No  chest tightness: No  wheezing: No  chest pain: No  palpitations: No  blood in stool: No  constipation: No  vomiting: No  diarrhea: Yes  polydipsia: No  polyuria: No  difficulty urinating: No  hematuria: No  menstrual problem: No  dysuria: No  joint swelling: No  arthralgias: Yes  headaches: Yes  weakness: No  confusion: No  dysphoric mood: No

## 2019-05-30 ENCOUNTER — OFFICE VISIT (OUTPATIENT)
Dept: INTERNAL MEDICINE | Facility: CLINIC | Age: 39
End: 2019-05-30
Attending: FAMILY MEDICINE
Payer: COMMERCIAL

## 2019-05-30 VITALS
OXYGEN SATURATION: 98 % | DIASTOLIC BLOOD PRESSURE: 78 MMHG | SYSTOLIC BLOOD PRESSURE: 110 MMHG | HEART RATE: 65 BPM | BODY MASS INDEX: 26.72 KG/M2 | WEIGHT: 156.5 LBS | HEIGHT: 64 IN

## 2019-05-30 DIAGNOSIS — F98.8 ATTENTION DEFICIT DISORDER (ADD) WITHOUT HYPERACTIVITY: ICD-10-CM

## 2019-05-30 DIAGNOSIS — M25.572 CHRONIC PAIN OF LEFT ANKLE: Primary | ICD-10-CM

## 2019-05-30 DIAGNOSIS — G89.29 CHRONIC PAIN OF LEFT ANKLE: Primary | ICD-10-CM

## 2019-05-30 PROCEDURE — 99999 PR PBB SHADOW E&M-EST. PATIENT-LVL III: CPT | Mod: PBBFAC,,, | Performed by: FAMILY MEDICINE

## 2019-05-30 PROCEDURE — 99999 PR PBB SHADOW E&M-EST. PATIENT-LVL III: ICD-10-PCS | Mod: PBBFAC,,, | Performed by: FAMILY MEDICINE

## 2019-05-30 PROCEDURE — 99214 OFFICE O/P EST MOD 30 MIN: CPT | Mod: S$GLB,,, | Performed by: FAMILY MEDICINE

## 2019-05-30 PROCEDURE — 99214 PR OFFICE/OUTPT VISIT, EST, LEVL IV, 30-39 MIN: ICD-10-PCS | Mod: S$GLB,,, | Performed by: FAMILY MEDICINE

## 2019-05-30 RX ORDER — DEXTROAMPHETAMINE SACCHARATE, AMPHETAMINE ASPARTATE, DEXTROAMPHETAMINE SULFATE AND AMPHETAMINE SULFATE 3.75; 3.75; 3.75; 3.75 MG/1; MG/1; MG/1; MG/1
15 TABLET ORAL 2 TIMES DAILY
Qty: 60 TABLET | Refills: 0 | Status: SHIPPED | OUTPATIENT
Start: 2019-06-29 | End: 2019-09-04

## 2019-05-30 RX ORDER — DEXTROAMPHETAMINE SACCHARATE, AMPHETAMINE ASPARTATE, DEXTROAMPHETAMINE SULFATE AND AMPHETAMINE SULFATE 3.75; 3.75; 3.75; 3.75 MG/1; MG/1; MG/1; MG/1
15 TABLET ORAL 2 TIMES DAILY
Qty: 60 TABLET | Refills: 0 | Status: SHIPPED | OUTPATIENT
Start: 2019-05-30 | End: 2019-09-04

## 2019-05-30 RX ORDER — NABUMETONE 500 MG/1
500 TABLET, FILM COATED ORAL 2 TIMES DAILY
Qty: 60 TABLET | Refills: 3 | Status: SHIPPED | OUTPATIENT
Start: 2019-05-30 | End: 2019-06-29

## 2019-05-30 RX ORDER — DEXTROAMPHETAMINE SACCHARATE, AMPHETAMINE ASPARTATE, DEXTROAMPHETAMINE SULFATE AND AMPHETAMINE SULFATE 3.75; 3.75; 3.75; 3.75 MG/1; MG/1; MG/1; MG/1
15 TABLET ORAL 2 TIMES DAILY
Qty: 60 TABLET | Refills: 0 | Status: SHIPPED | OUTPATIENT
Start: 2019-07-29 | End: 2019-09-04

## 2019-05-30 NOTE — PROGRESS NOTES
"CHIEF COMPLAINT:  ADD and chronic intermittent ankle pain    HISTORY OF PRESENT ILLNESS: The patient is a generally healthy 38 year-old white female.  She has been intermittent ankle pain for a while now.  It is not every day.  She previously had a significant injury to the ankle.    She has a long history of ADD for which she previously took Adderall.  Blood pressure weight and pulse are stable.    REVIEW OF SYSTEMS:  GENERAL: No fever, chills, fatigability or weight loss.  SKIN: No rashes, itching or changes in color or texture of skin.  HEAD: No headaches or recent head trauma.  EYES: Visual acuity fine. No photophobia, ocular pain or diplopia.  EARS: Denies ear pain, discharge or vertigo.  NOSE: No loss of smell, no epistaxis.  MOUTH & THROAT: No hoarseness or change in voice. No excessive gum bleeding.  NODES: Denies swollen glands.  CHEST: Denies ALFARO, cyanosis, wheezing.  CARDIOVASCULAR: Denies chest pain, PND, orthopnea or reduced exercise tolerance.  ABDOMEN: Appetite fine. No weight loss. Denies diarrhea, abdominal pain, hematemesis or blood in stool.  URINARY: No flank pain, dysuria or hematuria.  PERIPHERAL VASCULAR: No claudication or cyanosis.  MUSCULOSKELETAL: No joint stiffness or swelling. Except as above  NEUROLOGIC: No history of seizures, paralysis, alteration of gait or coordination.    SOCIAL HISTORY: The patient does not smoke.  The patient consumes alcohol socially.  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    PHYSICAL EXAMINATION:   Blood pressure 110/78, pulse 65, height 5' 4" (1.626 m), weight 71 kg (156 lb 8.4 oz), SpO2 98 %.  APPEARANCE: Well nourished, well developed, in no acute distress.    HEAD: Normocephalic, atraumatic.  EYES: PERRL. EOMI.  Conjunctivae without injection and  anicteric  EARS: TM's intact. Light reflex normal. No retraction or perforation.    NOSE: Mucosa pink. Airway clear.  MOUTH & THROAT: No tonsillar enlargement. No " pharyngeal erythema or exudate. No stridor.  NECK: Supple.   NODES: No cervical, axillary or inguinal lymph node enlargement.  CHEST: Lungs clear to auscultation.  No retractions are noted.  No rales or rhonchi are present.  CARDIOVASCULAR: Normal S1, S2. No rubs, murmurs or gallops.  ABDOMEN: Bowel sounds normal. Not distended. Soft. No tenderness or masses.  No ascites is noted.  MUSCULOSKELETAL:  There is no clubbing, cyanosis, or edema of the extremities x4.  There is full range of motion of the lumbar spine.  There is full range of motion of the extremities x4.  There is no deformity noted.    NEUROLOGIC:       Normal speech development.      Hearing normal.      Slightly antalgic gait.      Motor and sensory exams grossly normal.  PSYCHIATRIC: Patient is alert and oriented x3.  Thought processes are all normal.  There is no homicidality.  There is no suicidality.  There is no evidence of psychosis.    LABORATORY/RADIOLOGY:   Chart reviewed.      ASSESSMENT:   ADD   Intermittent ankle pain    PLAN:  Adderall 10 mg by mouth tid  Relafen    Return to clinic in 3 months  Answers for HPI/ROS submitted by the patient on 5/28/2019   activity change: No  unexpected weight change: No  neck pain: No  hearing loss: No  rhinorrhea: Yes  trouble swallowing: No  eye discharge: No  visual disturbance: No  chest tightness: No  wheezing: No  chest pain: No  palpitations: No  blood in stool: No  constipation: No  vomiting: No  diarrhea: No  polydipsia: No  polyuria: No  difficulty urinating: No  hematuria: No  menstrual problem: No  dysuria: No  joint swelling: No  arthralgias: Yes  headaches: Yes  weakness: No  confusion: No  dysphoric mood: No

## 2019-09-04 ENCOUNTER — OFFICE VISIT (OUTPATIENT)
Dept: INTERNAL MEDICINE | Facility: CLINIC | Age: 39
End: 2019-09-04
Attending: FAMILY MEDICINE
Payer: COMMERCIAL

## 2019-09-04 VITALS
OXYGEN SATURATION: 96 % | BODY MASS INDEX: 27.06 KG/M2 | HEART RATE: 89 BPM | HEIGHT: 64 IN | WEIGHT: 158.5 LBS | DIASTOLIC BLOOD PRESSURE: 70 MMHG | SYSTOLIC BLOOD PRESSURE: 104 MMHG

## 2019-09-04 DIAGNOSIS — F98.8 ATTENTION DEFICIT DISORDER (ADD) WITHOUT HYPERACTIVITY: Primary | ICD-10-CM

## 2019-09-04 PROCEDURE — 99999 PR PBB SHADOW E&M-EST. PATIENT-LVL III: ICD-10-PCS | Mod: PBBFAC,,, | Performed by: FAMILY MEDICINE

## 2019-09-04 PROCEDURE — 99999 PR PBB SHADOW E&M-EST. PATIENT-LVL III: CPT | Mod: PBBFAC,,, | Performed by: FAMILY MEDICINE

## 2019-09-04 PROCEDURE — 99214 OFFICE O/P EST MOD 30 MIN: CPT | Mod: S$GLB,,, | Performed by: FAMILY MEDICINE

## 2019-09-04 PROCEDURE — 99214 PR OFFICE/OUTPT VISIT, EST, LEVL IV, 30-39 MIN: ICD-10-PCS | Mod: S$GLB,,, | Performed by: FAMILY MEDICINE

## 2019-09-04 RX ORDER — DEXTROAMPHETAMINE SACCHARATE, AMPHETAMINE ASPARTATE, DEXTROAMPHETAMINE SULFATE AND AMPHETAMINE SULFATE 3.75; 3.75; 3.75; 3.75 MG/1; MG/1; MG/1; MG/1
15 TABLET ORAL 2 TIMES DAILY
Qty: 60 TABLET | Refills: 0 | Status: SHIPPED | OUTPATIENT
Start: 2019-10-04 | End: 2021-03-08

## 2019-09-04 RX ORDER — DEXTROAMPHETAMINE SACCHARATE, AMPHETAMINE ASPARTATE, DEXTROAMPHETAMINE SULFATE AND AMPHETAMINE SULFATE 3.75; 3.75; 3.75; 3.75 MG/1; MG/1; MG/1; MG/1
15 TABLET ORAL 2 TIMES DAILY
Qty: 60 TABLET | Refills: 0 | Status: SHIPPED | OUTPATIENT
Start: 2019-09-04 | End: 2021-03-08

## 2019-09-04 RX ORDER — DEXTROAMPHETAMINE SACCHARATE, AMPHETAMINE ASPARTATE, DEXTROAMPHETAMINE SULFATE AND AMPHETAMINE SULFATE 3.75; 3.75; 3.75; 3.75 MG/1; MG/1; MG/1; MG/1
15 TABLET ORAL 2 TIMES DAILY
Qty: 60 TABLET | Refills: 0 | Status: SHIPPED | OUTPATIENT
Start: 2019-11-03 | End: 2021-03-08

## 2019-09-04 NOTE — PROGRESS NOTES
"CHIEF COMPLAINT:  ADD    HISTORY OF PRESENT ILLNESS: The patient is a generally healthy 38 year-old white female.      She has a long history of ADD for which she previously took Adderall.  Blood pressure weight and pulse are stable.    REVIEW OF SYSTEMS:  GENERAL: No fever, chills, fatigability or weight loss.  SKIN: No rashes, itching or changes in color or texture of skin.  HEAD: No headaches or recent head trauma.  EYES: Visual acuity fine. No photophobia, ocular pain or diplopia.  EARS: Denies ear pain, discharge or vertigo.  NOSE: No loss of smell, no epistaxis.  MOUTH & THROAT: No hoarseness or change in voice. No excessive gum bleeding.  NODES: Denies swollen glands.  CHEST: Denies ALFARO, cyanosis, wheezing.  CARDIOVASCULAR: Denies chest pain, PND, orthopnea or reduced exercise tolerance.  ABDOMEN: Appetite fine. No weight loss. Denies diarrhea, abdominal pain, hematemesis or blood in stool.  URINARY: No flank pain, dysuria or hematuria.  PERIPHERAL VASCULAR: No claudication or cyanosis.  MUSCULOSKELETAL: No joint stiffness or swelling.   NEUROLOGIC: No history of seizures, paralysis, alteration of gait or coordination.    SOCIAL HISTORY: The patient does not smoke.  The patient consumes alcohol socially.  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    PHYSICAL EXAMINATION:   Blood pressure 104/70, pulse 89, height 5' 4" (1.626 m), weight 71.9 kg (158 lb 8.2 oz), SpO2 96 %.  APPEARANCE: Well nourished, well developed, in no acute distress.    HEAD: Normocephalic, atraumatic.  EYES: PERRL. EOMI.  Conjunctivae without injection and  anicteric  EARS: TM's intact. Light reflex normal. No retraction or perforation.    NOSE: Mucosa pink. Airway clear.  MOUTH & THROAT: No tonsillar enlargement. No pharyngeal erythema or exudate. No stridor.  NECK: Supple.   NODES: No cervical, axillary or inguinal lymph node enlargement.  CHEST: Lungs clear to auscultation.  No retractions " are noted.  No rales or rhonchi are present.  CARDIOVASCULAR: Normal S1, S2. No rubs, murmurs or gallops.  ABDOMEN: Bowel sounds normal. Not distended. Soft. No tenderness or masses.  No ascites is noted.  MUSCULOSKELETAL:  There is no clubbing, cyanosis, or edema of the extremities x4.  There is full range of motion of the lumbar spine.  There is full range of motion of the extremities x4.  There is no deformity noted.    NEUROLOGIC:       Normal speech development.      Hearing normal.      Slightly antalgic gait.      Motor and sensory exams grossly normal.  PSYCHIATRIC: Patient is alert and oriented x3.  Thought processes are all normal.  There is no homicidality.  There is no suicidality.  There is no evidence of psychosis.    LABORATORY/RADIOLOGY:   Chart reviewed.      ASSESSMENT:   ADD     PLAN:  Adderall 15 mg by mouth tid    Return to clinic in 3 months  Answers for HPI/ROS submitted by the patient on 9/3/2019   activity change: Yes  unexpected weight change: No  neck pain: Yes  hearing loss: No  rhinorrhea: Yes  trouble swallowing: No  eye discharge: No  visual disturbance: No  chest tightness: No  wheezing: No  chest pain: No  palpitations: No  blood in stool: No  constipation: No  vomiting: No  diarrhea: No  polydipsia: No  polyuria: No  difficulty urinating: No  hematuria: No  menstrual problem: No  dysuria: No  joint swelling: No  arthralgias: Yes  headaches: Yes  weakness: No  confusion: No  dysphoric mood: No

## 2019-09-26 ENCOUNTER — TELEPHONE (OUTPATIENT)
Dept: INTERNAL MEDICINE | Facility: CLINIC | Age: 39
End: 2019-09-26

## 2019-09-26 ENCOUNTER — PATIENT MESSAGE (OUTPATIENT)
Dept: INTERNAL MEDICINE | Facility: CLINIC | Age: 39
End: 2019-09-26

## 2019-09-26 DIAGNOSIS — F98.8 ATTENTION DEFICIT DISORDER (ADD) WITHOUT HYPERACTIVITY: Primary | ICD-10-CM

## 2019-09-26 RX ORDER — DEXTROAMPHETAMINE SACCHARATE, AMPHETAMINE ASPARTATE, DEXTROAMPHETAMINE SULFATE AND AMPHETAMINE SULFATE 2.5; 2.5; 2.5; 2.5 MG/1; MG/1; MG/1; MG/1
1 TABLET ORAL 3 TIMES DAILY
Qty: 90 TABLET | Refills: 0 | Status: SHIPPED | OUTPATIENT
Start: 2019-09-26 | End: 2021-03-08

## 2019-09-26 RX ORDER — DEXTROAMPHETAMINE SACCHARATE, AMPHETAMINE ASPARTATE, DEXTROAMPHETAMINE SULFATE AND AMPHETAMINE SULFATE 2.5; 2.5; 2.5; 2.5 MG/1; MG/1; MG/1; MG/1
1 TABLET ORAL 3 TIMES DAILY
Qty: 30 TABLET | Refills: 0 | Status: SHIPPED | OUTPATIENT
Start: 2019-09-26 | End: 2021-03-08

## 2019-09-26 RX ORDER — DEXTROAMPHETAMINE SACCHARATE, AMPHETAMINE ASPARTATE MONOHYDRATE, DEXTROAMPHETAMINE SULFATE AND AMPHETAMINE SULFATE 2.5; 2.5; 2.5; 2.5 MG/1; MG/1; MG/1; MG/1
10 CAPSULE, EXTENDED RELEASE ORAL EVERY MORNING
Qty: 90 CAPSULE | Refills: 0 | Status: CANCELLED | OUTPATIENT
Start: 2019-09-26

## 2020-01-06 ENCOUNTER — OFFICE VISIT (OUTPATIENT)
Dept: DERMATOLOGY | Facility: CLINIC | Age: 40
End: 2020-01-06
Payer: COMMERCIAL

## 2020-01-06 DIAGNOSIS — L81.4 LENTIGO: ICD-10-CM

## 2020-01-06 DIAGNOSIS — D22.9 NEVUS: ICD-10-CM

## 2020-01-06 DIAGNOSIS — D18.01 CHERRY ANGIOMA: Primary | ICD-10-CM

## 2020-01-06 DIAGNOSIS — L82.1 SK (SEBORRHEIC KERATOSIS): ICD-10-CM

## 2020-01-06 DIAGNOSIS — Z80.8 FAMILY HX OF MELANOMA: ICD-10-CM

## 2020-01-06 PROCEDURE — 99999 PR PBB SHADOW E&M-EST. PATIENT-LVL II: CPT | Mod: PBBFAC,,, | Performed by: DERMATOLOGY

## 2020-01-06 PROCEDURE — 99999 PR PBB SHADOW E&M-EST. PATIENT-LVL II: ICD-10-PCS | Mod: PBBFAC,,, | Performed by: DERMATOLOGY

## 2020-01-06 PROCEDURE — 99214 OFFICE O/P EST MOD 30 MIN: CPT | Mod: S$GLB,,, | Performed by: DERMATOLOGY

## 2020-01-06 PROCEDURE — 99214 PR OFFICE/OUTPT VISIT, EST, LEVL IV, 30-39 MIN: ICD-10-PCS | Mod: S$GLB,,, | Performed by: DERMATOLOGY

## 2020-01-06 NOTE — PROGRESS NOTES
"  Subjective:       Patient ID:  Patrizia Abel is a 39 y.o. female who presents for   Chief Complaint   Patient presents with    Skin Check    Spot     Patient is here today for a "mole" check.   Pt has a history of  significant sun exposure in the past.   Pt recalls several blistering sunburns in the past- yes  Pt has history of tanning bed use- yes  Pt has  had moles removed in the past- yes  Pt has history of melanoma in first degree relatives-  Yes, dad and maternal grandmother.     Pt c/o spot on abdomen, x 2wks, denies any symptoms, no tx  Pt is 13 WGA      Review of Systems   Constitutional: Negative for fever, chills, fatigue and malaise.   Skin: Positive for daily sunscreen use and activity-related sunscreen use. Negative for recent sunburn.   Hematologic/Lymphatic: Does not bruise/bleed easily.        Objective:    Physical Exam   Constitutional: She appears well-developed and well-nourished. No distress.   Neurological: She is alert and oriented to person, place, and time. She is not disoriented.   Psychiatric: She has a normal mood and affect.   Skin:   Areas Examined (abnormalities noted in diagram):   Scalp / Hair Palpated and Inspected  Head / Face Inspection Performed  Neck Inspection Performed  Chest / Axilla Inspection Performed  Abdomen Inspection Performed  Genitals / Buttocks / Groin Inspection Performed  Back Inspection Performed  RUE Inspected  LUE Inspection Performed  RLE Inspected  LLE Inspection Performed  Nails and Digits Inspection Performed                       Diagram Legend     Erythematous scaling macule/papule c/w actinic keratosis       Vascular papule c/w angioma      Pigmented verrucoid papule/plaque c/w seborrheic keratosis      Yellow umbilicated papule c/w sebaceous hyperplasia      Irregularly shaped tan macule c/w lentigo     1-2 mm smooth white papules consistent with Milia      Movable subcutaneous cyst with punctum c/w epidermal inclusion cyst      Subcutaneous " movable cyst c/w pilar cyst      Firm pink to brown papule c/w dermatofibroma      Pedunculated fleshy papule(s) c/w skin tag(s)      Evenly pigmented macule c/w junctional nevus     Mildly variegated pigmented, slightly irregular-bordered macule c/w mildly atypical nevus      Flesh colored to evenly pigmented papule c/w intradermal nevus       Pink pearly papule/plaque c/w basal cell carcinoma      Erythematous hyperkeratotic cursted plaque c/w SCC      Surgical scar with no sign of skin cancer recurrence      Open and closed comedones      Inflammatory papules and pustules      Verrucoid papule consistent consistent with wart     Erythematous eczematous patches and plaques     Dystrophic onycholytic nail with subungual debris c/w onychomycosis     Umbilicated papule    Erythematous-base heme-crusted tan verrucoid plaque consistent with inflamed seborrheic keratosis     Erythematous Silvery Scaling Plaque c/w Psoriasis     See annotation      Assessment / Plan:        Cherry angioma  ,These are benign vascular lesions that are inherited.  Treatment is not necessary.    Lentigo  This is a benign hyperpigmented sun induced lesion. Daily sun protection will reduce the number of new lesions. Treatment of these benign lesions are considered cosmetic.  The nature of sun-induced photo-aging and skin cancers is discussed.  Sun avoidance, protective clothing, and the use of 30-SPF sunscreens is advised. Observe closely for skin damage/changes, and call if such occurs.    Nevus  Discussed ABCDE's of nevi.  Monitor for new mole or moles that are becoming bigger, darker, irritated, or developing irregular borders. Brochure provided.    Family hx of melanoma  Total body skin examination performed today including at least 12 points as noted in physical examination. No lesions suspicious for malignancy noted.    SK (seborrheic keratosis)  These are benign inherited growths without a malignant potential. Reassurance given to  patient. No treatment is necessary.                Follow up in about 1 year (around 1/6/2021).

## 2020-08-20 ENCOUNTER — OFFICE VISIT (OUTPATIENT)
Dept: INTERNAL MEDICINE | Facility: CLINIC | Age: 40
End: 2020-08-20
Attending: FAMILY MEDICINE
Payer: COMMERCIAL

## 2020-08-20 DIAGNOSIS — H00.011 HORDEOLUM EXTERNUM OF RIGHT UPPER EYELID: Primary | ICD-10-CM

## 2020-08-20 DIAGNOSIS — F98.8 ATTENTION DEFICIT DISORDER (ADD) WITHOUT HYPERACTIVITY: ICD-10-CM

## 2020-08-20 PROCEDURE — 99214 PR OFFICE/OUTPT VISIT, EST, LEVL IV, 30-39 MIN: ICD-10-PCS | Mod: 95,,, | Performed by: FAMILY MEDICINE

## 2020-08-20 PROCEDURE — 99214 OFFICE O/P EST MOD 30 MIN: CPT | Mod: 95,,, | Performed by: FAMILY MEDICINE

## 2020-08-20 NOTE — PROGRESS NOTES
CHIEF COMPLAINT:  OD upper lid stye overnight and ADD    HISTORY OF PRESENT ILLNESS: The patient is a generally healthy 39 year-old white female.  She is 4 weeks postpartum.  She has OD upper lid stye that developed overnight.    She has a long history of ADD for which she previously took Adderall.      REVIEW OF SYSTEMS:  GENERAL: No fever, chills, fatigability or weight loss.  SKIN: No rashes, itching or changes in color or texture of skin.  HEAD: No headaches or recent head trauma.  EYES: Visual acuity fine. No photophobia, ocular pain or diplopia.  EARS: Denies ear pain, discharge or vertigo.  NOSE: No loss of smell, no epistaxis.  MOUTH & THROAT: No hoarseness or change in voice. No excessive gum bleeding.  NODES: Denies swollen glands.  CHEST: Denies ALFARO, cyanosis, wheezing.  CARDIOVASCULAR: Denies chest pain, PND, orthopnea or reduced exercise tolerance.  ABDOMEN: Appetite fine. No weight loss. Denies diarrhea, abdominal pain, hematemesis or blood in stool.  URINARY: No flank pain, dysuria or hematuria.  PERIPHERAL VASCULAR: No claudication or cyanosis.  MUSCULOSKELETAL: No joint stiffness or swelling.   NEUROLOGIC: No history of seizures, paralysis, alteration of gait or coordination.    SOCIAL HISTORY: The patient does not smoke.  The patient consumes alcohol socially.  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    PHYSICAL EXAMINATION:   There were no vitals taken for this visit.  APPEARANCE: Well nourished, well developed, in no acute distress.    HEAD: Normocephalic, atraumatic.  EYES: PERRL. EOMI.  Conjunctivae without injection and  Anicteric.  There is a large OD upper lid stye with significant erythema and edema.  NEUROLOGIC:       Normal speech development.      Hearing normal.  PSYCHIATRIC: Patient is alert and oriented x3.  Thought processes are all normal.  There is no homicidality.  There is no suicidality.  There is no evidence of  psychosis.    LABORATORY/RADIOLOGY:   Chart reviewed.      ASSESSMENT:   OD upper lid stye overnight  ADD     PLAN:  TobraDex ointment  Call if not better in 1-2 weeks for ophthalmology referral  Adderall 15 mg by mouth tid when not breast feeding  Return to clinic in 3 months          Answers for HPI/ROS submitted by the patient on 8/20/2020   activity change: No  unexpected weight change: No  neck pain: No  hearing loss: No  rhinorrhea: Yes  trouble swallowing: No  eye discharge: Yes  visual disturbance: No  chest tightness: No  wheezing: No  chest pain: No  palpitations: No  blood in stool: No  constipation: No  vomiting: No  diarrhea: No  polydipsia: No  polyuria: No  difficulty urinating: No  hematuria: No  menstrual problem: No  dysuria: No  joint swelling: No  arthralgias: No  headaches: No  weakness: No  confusion: No  dysphoric mood: No

## 2020-10-03 ENCOUNTER — IMMUNIZATION (OUTPATIENT)
Dept: PRIMARY CARE CLINIC | Facility: CLINIC | Age: 40
End: 2020-10-03
Payer: COMMERCIAL

## 2020-10-03 PROCEDURE — 90471 IMMUNIZATION ADMIN: CPT | Mod: S$GLB,,, | Performed by: FAMILY MEDICINE

## 2020-10-03 PROCEDURE — 90688 IIV4 VACCINE SPLT 0.5 ML IM: CPT | Mod: S$GLB,,, | Performed by: FAMILY MEDICINE

## 2020-10-03 PROCEDURE — 99999 PR PBB SHADOW E&M-EST. PATIENT-LVL I: ICD-10-PCS | Mod: PBBFAC,,,

## 2020-10-03 PROCEDURE — 99999 PR PBB SHADOW E&M-EST. PATIENT-LVL I: CPT | Mod: PBBFAC,,,

## 2020-10-03 PROCEDURE — 90688 FLU VACCINE (QUAD) 6-35MO W/ PRESERVATIVE: ICD-10-PCS | Mod: S$GLB,,, | Performed by: FAMILY MEDICINE

## 2020-10-03 PROCEDURE — 90471 FLU VACCINE (QUAD) 6-35MO W/ PRESERVATIVE: ICD-10-PCS | Mod: S$GLB,,, | Performed by: FAMILY MEDICINE

## 2020-10-07 ENCOUNTER — PATIENT MESSAGE (OUTPATIENT)
Dept: ADMINISTRATIVE | Facility: HOSPITAL | Age: 40
End: 2020-10-07

## 2021-01-05 ENCOUNTER — PATIENT MESSAGE (OUTPATIENT)
Dept: ADMINISTRATIVE | Facility: HOSPITAL | Age: 41
End: 2021-01-05

## 2021-03-08 ENCOUNTER — OFFICE VISIT (OUTPATIENT)
Dept: INTERNAL MEDICINE | Facility: CLINIC | Age: 41
End: 2021-03-08
Attending: FAMILY MEDICINE
Payer: COMMERCIAL

## 2021-03-08 VITALS
SYSTOLIC BLOOD PRESSURE: 108 MMHG | DIASTOLIC BLOOD PRESSURE: 72 MMHG | WEIGHT: 175.5 LBS | HEART RATE: 71 BPM | BODY MASS INDEX: 29.96 KG/M2 | OXYGEN SATURATION: 98 % | HEIGHT: 64 IN

## 2021-03-08 DIAGNOSIS — F98.8 ATTENTION DEFICIT DISORDER (ADD) WITHOUT HYPERACTIVITY: ICD-10-CM

## 2021-03-08 DIAGNOSIS — Z00.00 PREVENTATIVE HEALTH CARE: Primary | ICD-10-CM

## 2021-03-08 PROCEDURE — 99395 PREV VISIT EST AGE 18-39: CPT | Mod: S$GLB,,, | Performed by: FAMILY MEDICINE

## 2021-03-08 PROCEDURE — 99395 PR PREVENTIVE VISIT,EST,18-39: ICD-10-PCS | Mod: S$GLB,,, | Performed by: FAMILY MEDICINE

## 2021-03-08 PROCEDURE — 99999 PR PBB SHADOW E&M-EST. PATIENT-LVL III: CPT | Mod: PBBFAC,,, | Performed by: FAMILY MEDICINE

## 2021-03-08 PROCEDURE — 99999 PR PBB SHADOW E&M-EST. PATIENT-LVL III: ICD-10-PCS | Mod: PBBFAC,,, | Performed by: FAMILY MEDICINE

## 2021-03-08 RX ORDER — DEXTROAMPHETAMINE SACCHARATE, AMPHETAMINE ASPARTATE, DEXTROAMPHETAMINE SULFATE AND AMPHETAMINE SULFATE 3.75; 3.75; 3.75; 3.75 MG/1; MG/1; MG/1; MG/1
15 TABLET ORAL 2 TIMES DAILY
Qty: 60 TABLET | Refills: 0 | Status: SHIPPED | OUTPATIENT
Start: 2021-03-08 | End: 2022-07-11

## 2021-03-08 RX ORDER — DEXTROAMPHETAMINE SACCHARATE, AMPHETAMINE ASPARTATE, DEXTROAMPHETAMINE SULFATE AND AMPHETAMINE SULFATE 3.75; 3.75; 3.75; 3.75 MG/1; MG/1; MG/1; MG/1
15 TABLET ORAL 2 TIMES DAILY
Qty: 60 TABLET | Refills: 0 | Status: SHIPPED | OUTPATIENT
Start: 2021-04-07 | End: 2022-07-11

## 2021-03-08 RX ORDER — DEXTROAMPHETAMINE SACCHARATE, AMPHETAMINE ASPARTATE, DEXTROAMPHETAMINE SULFATE AND AMPHETAMINE SULFATE 3.75; 3.75; 3.75; 3.75 MG/1; MG/1; MG/1; MG/1
15 TABLET ORAL 2 TIMES DAILY
Qty: 60 TABLET | Refills: 0 | Status: SHIPPED | OUTPATIENT
Start: 2021-05-07 | End: 2022-07-11

## 2021-03-13 LAB
ALBUMIN SERPL-MCNC: 4.4 G/DL (ref 3.6–5.1)
ALBUMIN/GLOB SERPL: 2 (CALC) (ref 1–2.5)
ALP SERPL-CCNC: 41 U/L (ref 31–125)
ALT SERPL-CCNC: 16 U/L (ref 6–29)
AST SERPL-CCNC: 13 U/L (ref 10–30)
BILIRUB SERPL-MCNC: 1.1 MG/DL (ref 0.2–1.2)
BUN SERPL-MCNC: 7 MG/DL (ref 7–25)
BUN/CREAT SERPL: NORMAL (CALC) (ref 6–22)
CALCIUM SERPL-MCNC: 9 MG/DL (ref 8.6–10.2)
CHLORIDE SERPL-SCNC: 103 MMOL/L (ref 98–110)
CHOLEST SERPL-MCNC: 215 MG/DL
CHOLEST/HDLC SERPL: 4.6 (CALC)
CO2 SERPL-SCNC: 25 MMOL/L (ref 20–32)
CREAT SERPL-MCNC: 0.63 MG/DL (ref 0.5–1.1)
GFRSERPLBLD MDRD-ARVRAT: 113 ML/MIN/1.73M2
GLOBULIN SER CALC-MCNC: 2.2 G/DL (CALC) (ref 1.9–3.7)
GLUCOSE SERPL-MCNC: 81 MG/DL (ref 65–99)
HBA1C MFR BLD: 4.6 % OF TOTAL HGB
HDLC SERPL-MCNC: 47 MG/DL
LDLC SERPL CALC-MCNC: 146 MG/DL (CALC)
NONHDLC SERPL-MCNC: 168 MG/DL (CALC)
POTASSIUM SERPL-SCNC: 3.9 MMOL/L (ref 3.5–5.3)
PROT SERPL-MCNC: 6.6 G/DL (ref 6.1–8.1)
SODIUM SERPL-SCNC: 139 MMOL/L (ref 135–146)
TRIGL SERPL-MCNC: 108 MG/DL
TSH SERPL-ACNC: 2.08 MIU/L

## 2021-04-05 ENCOUNTER — PATIENT MESSAGE (OUTPATIENT)
Dept: ADMINISTRATIVE | Facility: HOSPITAL | Age: 41
End: 2021-04-05

## 2021-04-23 ENCOUNTER — PATIENT MESSAGE (OUTPATIENT)
Dept: INTERNAL MEDICINE | Facility: CLINIC | Age: 41
End: 2021-04-23

## 2021-05-25 ENCOUNTER — PATIENT MESSAGE (OUTPATIENT)
Dept: INTERNAL MEDICINE | Facility: CLINIC | Age: 41
End: 2021-05-25

## 2021-07-06 ENCOUNTER — PATIENT MESSAGE (OUTPATIENT)
Dept: INTERNAL MEDICINE | Facility: CLINIC | Age: 41
End: 2021-07-06

## 2021-07-19 ENCOUNTER — OFFICE VISIT (OUTPATIENT)
Dept: INTERNAL MEDICINE | Facility: CLINIC | Age: 41
End: 2021-07-19
Attending: FAMILY MEDICINE
Payer: COMMERCIAL

## 2021-07-19 VITALS
DIASTOLIC BLOOD PRESSURE: 78 MMHG | HEART RATE: 86 BPM | BODY MASS INDEX: 27.85 KG/M2 | WEIGHT: 163.13 LBS | OXYGEN SATURATION: 98 % | HEIGHT: 64 IN | SYSTOLIC BLOOD PRESSURE: 108 MMHG

## 2021-07-19 DIAGNOSIS — F98.8 ATTENTION DEFICIT DISORDER (ADD) WITHOUT HYPERACTIVITY: Primary | ICD-10-CM

## 2021-07-19 PROCEDURE — 99999 PR PBB SHADOW E&M-EST. PATIENT-LVL IV: ICD-10-PCS | Mod: PBBFAC,,, | Performed by: FAMILY MEDICINE

## 2021-07-19 PROCEDURE — 99999 PR PBB SHADOW E&M-EST. PATIENT-LVL IV: CPT | Mod: PBBFAC,,, | Performed by: FAMILY MEDICINE

## 2021-07-19 PROCEDURE — 99214 PR OFFICE/OUTPT VISIT, EST, LEVL IV, 30-39 MIN: ICD-10-PCS | Mod: S$GLB,,, | Performed by: FAMILY MEDICINE

## 2021-07-19 PROCEDURE — 99214 OFFICE O/P EST MOD 30 MIN: CPT | Mod: S$GLB,,, | Performed by: FAMILY MEDICINE

## 2021-07-19 RX ORDER — CETIRIZINE HYDROCHLORIDE 10 MG/1
10 TABLET ORAL
COMMUNITY

## 2021-07-19 RX ORDER — DEXTROAMPHETAMINE SACCHARATE, AMPHETAMINE ASPARTATE, DEXTROAMPHETAMINE SULFATE AND AMPHETAMINE SULFATE 3.75; 3.75; 3.75; 3.75 MG/1; MG/1; MG/1; MG/1
15 TABLET ORAL 2 TIMES DAILY
Qty: 60 TABLET | Refills: 0 | Status: SHIPPED | OUTPATIENT
Start: 2021-08-18 | End: 2022-07-11

## 2021-07-19 RX ORDER — DEXTROAMPHETAMINE SACCHARATE, AMPHETAMINE ASPARTATE, DEXTROAMPHETAMINE SULFATE AND AMPHETAMINE SULFATE 3.75; 3.75; 3.75; 3.75 MG/1; MG/1; MG/1; MG/1
15 TABLET ORAL 2 TIMES DAILY
Qty: 60 TABLET | Refills: 0 | Status: SHIPPED | OUTPATIENT
Start: 2021-07-19 | End: 2021-07-28 | Stop reason: SDUPTHER

## 2021-07-19 RX ORDER — LEVONORGESTREL AND ETHINYL ESTRADIOL AND ETHINYL ESTRADIOL 150-30(84)
1 KIT ORAL DAILY
COMMUNITY
Start: 2021-05-04

## 2021-07-19 RX ORDER — AMITRIPTYLINE HYDROCHLORIDE 10 MG/1
10 TABLET, FILM COATED ORAL NIGHTLY PRN
Qty: 90 TABLET | Refills: 3 | Status: SHIPPED | OUTPATIENT
Start: 2021-07-19 | End: 2022-07-11

## 2021-07-19 RX ORDER — DEXTROAMPHETAMINE SACCHARATE, AMPHETAMINE ASPARTATE, DEXTROAMPHETAMINE SULFATE AND AMPHETAMINE SULFATE 3.75; 3.75; 3.75; 3.75 MG/1; MG/1; MG/1; MG/1
15 TABLET ORAL 2 TIMES DAILY
Qty: 60 TABLET | Refills: 0 | Status: SHIPPED | OUTPATIENT
Start: 2021-09-17 | End: 2022-07-11

## 2021-07-23 ENCOUNTER — PATIENT MESSAGE (OUTPATIENT)
Dept: INTERNAL MEDICINE | Facility: CLINIC | Age: 41
End: 2021-07-23

## 2021-07-28 RX ORDER — DEXTROAMPHETAMINE SACCHARATE, AMPHETAMINE ASPARTATE, DEXTROAMPHETAMINE SULFATE AND AMPHETAMINE SULFATE 3.75; 3.75; 3.75; 3.75 MG/1; MG/1; MG/1; MG/1
15 TABLET ORAL 2 TIMES DAILY
Qty: 60 TABLET | Refills: 0 | Status: SHIPPED | OUTPATIENT
Start: 2021-07-28 | End: 2022-07-11

## 2021-10-05 ENCOUNTER — PATIENT MESSAGE (OUTPATIENT)
Dept: ADMINISTRATIVE | Facility: HOSPITAL | Age: 41
End: 2021-10-05

## 2021-10-09 ENCOUNTER — IMMUNIZATION (OUTPATIENT)
Dept: PRIMARY CARE CLINIC | Facility: CLINIC | Age: 41
End: 2021-10-09
Payer: COMMERCIAL

## 2021-10-09 PROCEDURE — 90686 FLU VACCINE (QUAD) GREATER THAN OR EQUAL TO 3YO PRESERVATIVE FREE IM: ICD-10-PCS | Mod: S$GLB,,, | Performed by: FAMILY MEDICINE

## 2021-10-09 PROCEDURE — 90686 IIV4 VACC NO PRSV 0.5 ML IM: CPT | Mod: S$GLB,,, | Performed by: FAMILY MEDICINE

## 2021-10-09 PROCEDURE — 90471 IMMUNIZATION ADMIN: CPT | Mod: S$GLB,,, | Performed by: FAMILY MEDICINE

## 2021-10-09 PROCEDURE — 90471 FLU VACCINE (QUAD) GREATER THAN OR EQUAL TO 3YO PRESERVATIVE FREE IM: ICD-10-PCS | Mod: S$GLB,,, | Performed by: FAMILY MEDICINE

## 2021-12-14 ENCOUNTER — PATIENT MESSAGE (OUTPATIENT)
Dept: INTERNAL MEDICINE | Facility: CLINIC | Age: 41
End: 2021-12-14
Payer: OTHER GOVERNMENT

## 2021-12-15 ENCOUNTER — PATIENT MESSAGE (OUTPATIENT)
Dept: INTERNAL MEDICINE | Facility: CLINIC | Age: 41
End: 2021-12-15
Payer: OTHER GOVERNMENT

## 2021-12-31 ENCOUNTER — PATIENT MESSAGE (OUTPATIENT)
Dept: ADMINISTRATIVE | Facility: OTHER | Age: 41
End: 2021-12-31
Payer: OTHER GOVERNMENT

## 2022-02-23 DIAGNOSIS — Z12.31 OTHER SCREENING MAMMOGRAM: ICD-10-CM

## 2022-05-30 ENCOUNTER — PATIENT MESSAGE (OUTPATIENT)
Dept: ADMINISTRATIVE | Facility: HOSPITAL | Age: 42
End: 2022-05-30
Payer: COMMERCIAL

## 2022-07-08 ENCOUNTER — PATIENT MESSAGE (OUTPATIENT)
Dept: HEMATOLOGY/ONCOLOGY | Facility: CLINIC | Age: 42
End: 2022-07-08
Payer: COMMERCIAL

## 2022-07-11 ENCOUNTER — LAB VISIT (OUTPATIENT)
Dept: LAB | Facility: HOSPITAL | Age: 42
End: 2022-07-11
Payer: COMMERCIAL

## 2022-07-11 ENCOUNTER — OFFICE VISIT (OUTPATIENT)
Dept: HEMATOLOGY/ONCOLOGY | Facility: CLINIC | Age: 42
End: 2022-07-11
Payer: OTHER GOVERNMENT

## 2022-07-11 DIAGNOSIS — Z80.3 FAMILY HISTORY OF BREAST CANCER: ICD-10-CM

## 2022-07-11 DIAGNOSIS — Z80.0 FAMILY HISTORY OF PANCREATIC CANCER: ICD-10-CM

## 2022-07-11 DIAGNOSIS — Z80.8 FAMILY HISTORY OF MELANOMA: ICD-10-CM

## 2022-07-11 DIAGNOSIS — Z71.83 ENCOUNTER FOR NONPROCREATIVE GENETIC COUNSELING: Primary | ICD-10-CM

## 2022-07-11 DIAGNOSIS — Z80.42 FAMILY HISTORY OF PROSTATE CANCER: ICD-10-CM

## 2022-07-11 DIAGNOSIS — Z80.0 FAMILY HISTORY OF COLON CANCER: ICD-10-CM

## 2022-07-11 PROCEDURE — 99999 PR PBB SHADOW E&M-EST. PATIENT-LVL II: ICD-10-PCS | Mod: PBBFAC,,, | Performed by: PHYSICIAN ASSISTANT

## 2022-07-11 PROCEDURE — 99205 PR OFFICE/OUTPT VISIT, NEW, LEVL V, 60-74 MIN: ICD-10-PCS | Mod: S$PBB,,, | Performed by: PHYSICIAN ASSISTANT

## 2022-07-11 PROCEDURE — 99999 PR PBB SHADOW E&M-EST. PATIENT-LVL II: CPT | Mod: PBBFAC,,, | Performed by: PHYSICIAN ASSISTANT

## 2022-07-11 PROCEDURE — 99205 OFFICE O/P NEW HI 60 MIN: CPT | Mod: S$PBB,,, | Performed by: PHYSICIAN ASSISTANT

## 2022-07-11 PROCEDURE — 99212 OFFICE O/P EST SF 10 MIN: CPT | Mod: PBBFAC | Performed by: PHYSICIAN ASSISTANT

## 2022-07-11 PROCEDURE — 36415 COLL VENOUS BLD VENIPUNCTURE: CPT | Performed by: PHYSICIAN ASSISTANT

## 2022-07-11 NOTE — PROGRESS NOTES
"  Department of Hematology and Oncology  [facility]    Cancer Genetics  Initial Consult    Date of Service:  22  Visit Provider:  Penny Patel PA-C  Collaborating Physician:  Kelley Gutierrez MD    Patient:  Patrizia Abel  :  1980  MRN:  [id number]     Referring Provider    [facility]  [address] [place] [address]    SUBJECTIVE      Chief Complaint: Genetic evaluation  History of Present Illness (HPI):  Patrizia Abel ("Patrizia"), 41 y.o., assigned female sex at birth, is new to the [facility] and to me.  She was referred for genetic cancer risk assessment given her extensive family history of cancer.    Genetic Assessment History   Germline cancer-genetic testing: No   Personal history of cancer: No   Benign tumors: No   Colon polyps: Yes, unsure of number and pathology.   Pancreatitis: No   Chemoprevention: Never used   Uterus and ovaries intact: Yes   Ashkenazi Orthodoxy ancestry: No     Past Medical History:   Diagnosis Date    Ankle disorder     Anxiety      Patient Active Problem List    Diagnosis Date Noted    History of  section complicating pregnancy - desires repeat with btl 10/12/2016      Family History   Germline cancer-genetic testing in blood relatives:  No   Consanguinity in ancestors:  No   No known history of cancer of colorectal polyps in extended family other than noted below.       Family History   Problem Relation Age of Onset    Melanoma Maternal Grandmother     Pancreatic cancer Maternal Grandmother     Diabetes Maternal Grandmother     Colon cancer Father 80    Lymphoma Father         CML    Glaucoma Father     Melanoma Father         <40    Colon cancer Mother 65        Met s/p RT, chemo    Lung cancer Maternal Grandfather     No Known Problems Paternal Grandmother     No Known Problems Paternal Grandfather  "    Thyroid cancer Maternal Aunt     Prostate cancer Maternal Uncle     Heart disease Maternal Uncle     Thyroid disease Maternal Uncle     Colon cancer Maternal Uncle     No Known Problems Other     Heart attacks under age 50 Maternal Uncle 39    Brain cancer Maternal Uncle         possibly    Prostate cancer Maternal Uncle     Colon cancer Maternal Uncle     Colon cancer Maternal Uncle     Asbestos Maternal Aunt     Thyroid cancer Maternal Aunt         possibly    Thyroid cancer Maternal Aunt         possibly    Lung cancer Maternal Cousin         Met, non-smoker    Breast cancer Paternal Aunt     Ovarian cancer Neg Hx     Amblyopia Neg Hx     Blindness Neg Hx     Cataracts Neg Hx     Hypertension Neg Hx     Macular degeneration Neg Hx     Retinal detachment Neg Hx     Strabismus Neg Hx     Stroke Neg Hx       Review of Systems   See HPI.     Pain 2/10   Recent falls: None    Patient's Distress Score today was 3/10 (with 10 being the worst).       OBJECTIVE      Physical Exam  Very pleasant patient.  Unaccompanied  Vitals signs:  There were no vitals filed for this visit.   Constitutional      Appearance:  Well developed and well nourished. No apparent distress.   Pulmonary     Effort:  Normal  Neurological     Mental Status:  Alert and oriented.     Coordination:  Normal  Psychiatric        Mood and Affect: Normal     Thought Content:  Normal       Speech:  Normal     Behavior:  Normal     Judgment:  Normal  Genetics-specific     It is my assessment that the patient is ready to proceed with cancer-genetic testing from a psychosocial perspective.    COUNSELING      Germline cancer-genetic testing is the testing of genes associated with cancer, known as cancer susceptibility genes.  Just as these genes are inherited from parents, mutations in these genes can be inherited, as well.  A mutation in a cancer susceptibility gene adversely affects the gene's ability to prevent cancer;  therefore, carriers of cancer susceptibility gene mutations may be at increased risk for certain cancers.    A small percentage of cancers are caused by an cancer susceptibility gene mutation, meaning the cancer is genetic/hereditary; rather, most cancers are sporadic.  Causes of sporadic cancers may include environmental risk factors, lifestyle risk factors, and non-modifiable risk factors.  It is important to note that members of a family often share not only their genetics but also risk factors including environmental and lifestyle risk factors.    Results of genetic testing include positive, negative, and variant of unknown significance (VUS).  A positive result indicates the presence of at least one clinically significant mutation, and the patient's specific cancer risks vary depending upon the tumor-suppressor gene(s) in which there is/are a mutation(s).  With a positive result, in some cases, depending upon the specific result and the patient's clinical history, modified management may be recommended, including measures for risk reduction and/or surveillance; however, modified management is not always an option.  A negative result indicates that no clinically significant mutations were identified in the gene(s) tested.  A VUS indicates that there is not presently enough data for the laboratory to make a determination as to whether the variant is clinically significant; VUSs are not typically acted upon clinically.      The ability to interpret the meaning of a negative genetic testing result in genes associated with cancer with which the patient has not personally been affected, when done prior to testing the appropriate affected relative(s), is significantly limited.  A negative result in the patient does not indicate that she cannot develop cancer, and, in fact, the patient may even be at increased risk for cancer based on shared risk factors with affected relatives.  The most informative candidates for  initial genetic testing in a family are those who have been affected with cancer.    Modified management may also be recommended, even with a result of no or unknown significance, based upon risk assessment that incorporates the family history.      Sometimes, depending upon the genetic testing result and the cancer diagnosis, additional/modified treatments may be an option, though this is not guaranteed.    If Patrizia tests positive for a mutation, her first-degree relatives would each have a 50% chance of having the same mutation, and other, more distantly related blood-relatives would also be at risk of having the same mutation.       The Genetic Information Nondiscrimination Act (SARAH) is U.S. federal legislation that provides some protections against use of an individual's genetic information by their health insurer and by their employer.  Title I of SARAH prohibits most health insurers from utilizing an individual's genetic information to make decisions regarding insurance eligibility, coverage, underwriting, or premium charges.  Title II of SARAH prohibits covered entities, which include employers, employment agencies, labor organizations, and joint labor-management training and apprenticeship programs, from requesting, requiring, or disclosing the genetic information of employees and applicants.  SARAH also prohibits health insurers and employers from asking or mandating that an individual take a genetic test.  SARAH does not protect individuals from genetic discrimination toward health insurance obtained through a job with the  or through the Federal Employees Health Benefits Plan; from genetic discrimination by employers with fewer than 15 employees; or from genetic discrimination by any other type of policies/entities, including but not limited to life insurance, disability insurance, long-term care insurance,  benefits, and [facility] benefits.     An outside laboratory would  perform the testing after a blood sample is collected here at the [facility] or a saliva sample is collected by the patient at home.  With genetic testing, there is a potential for the patient to incur out-of-pocket costs.  Results can take several weeks.  Post-test genetic counseling can be conducted once the genetic testing results are available.     Questions were encouraged and answered to the patient's satisfaction, and she verbalized understanding of information and agreement with the plan.       ASSESSMENT/PLAN      A cancer-genetic evaluation and pre-test genetic counseling were conducted. Based on the information provided by Patrizia, her personal and/or family history is suggestive of a potential potential hereditary predisposition to cancer. There are multiple relatives on both sides of the family, including both of her parents, who had potentially hereditary cancers. The most suspicious is the maternal family:    · Colon cancer: Mother, maternal uncles x3  · Thyroid cancer: Maternal aunts x3  · Prostate cancer: Maternal uncles x2  · Lung cancer in non-smokers: MGF and maternal cousin both in their 40s  · Pancreatic cancer: MGM  · Melanoma: MGM  · Brain: Maternal aunt    The recommendation is to proceed with germline testing to include the genes commonly associated with hereditary breast cancer (BOB, BARD1, BRCA1, BRCA2, CDH1, CHEK2, NF1, PALB2, PTEN, RAD51C, RAD51D, STK11, TP53), pancreatic cancer (BOB, BRCA1, BRCA2, CDKN2A, EPCAM, MLH1, MSH2, MSH6, PALB2, PMS2, PRSS1, STK11, TP53), prostate cancer (BOB, BRCA1, BRCA2, CHEK2, EPCAM, HOXB13, MLH1, MSH2, MSH6, NBN, PALB2, PMS2, RAD51D, TP53), melanoma (BAP1, BRCA2, CDK4, CDKN2A, MC1R, MITF, POT1, PTEN, RB1, TP53)), colorectal cancer (APC, AXIN2, BMPR1A, CHEK2, EPCAM, GREM1, MLH1, MSH2, MSH3, MSH6, MUTYH, NTHL1, PMS2, POLD1, POLE, PTEN, RNF43, SMAD4, STK11, TP53) and thyroid cancer (APC, OKDBH8R, PTEN, RET, WRN). Patrizia was given the option of  proceeding with testing now, deferring testing to a later date, or declining testing and opted to proceed.    Genetic test: [facility] Multi-Cancer +RNA Panel, 84 genes ([id number])  Specimen type: blood   Collection: By [facility] today.    Consent: The patient has provided her written informed consent.    Results are expected approximately 2-3 weeks after the genetic testing laboratory receives the specimen.      Encounter for non-procreative genetic counseling  No personal history of cancer  Family history breast cancer (Z80.3), pancreatic cancer (Z80.0), colon cancer (Z80.0), prostate cancer (Z80.42), melanoma (Z80.8) and thyroid cancer (Z80.8)  1. Proceed with germline genetic testing.  2. Follow up with results.     Approximately 60 minutes were spent face-to-face with the patient.  Approximately 85 minutes in total were spent on this encounter, which includes face-to-face time and non-face-to-face time preparing to see the patient (e.g., review of tests), obtaining and/or reviewing separately obtained history, documenting clinical information in the electronic or other health record, independently interpreting results (not separately reported) and communicating results to the patient/family/caregiver, or care coordination (not separately reported).     REFERENCES     1. National Comprehensive Cancer Network (NCCN). (2021). Genetic/familial high-risk assessment: Breast, ovarian, and pancreatic. NCCN Clinical Practice Guidelines in Oncology (NCCN Guidelines), Version 1.2022.  2. National Comprehensive Cancer Network (NCCN). (2021). Genetic/familial high-risk assessment: Colorectal. NCCN Clinical Practice Guidelines in Oncology (NCCN Guidelines), Version 1.2021.  3. National Comprehensive Cancer Network (NCCN). (2021). Prostate cancer. NCCN Clinical Practice Guidelines in Oncology (NCCN Guidelines), Version 1.2022.    Penny Patel PA-C, MPAS, PA-C  Physician Assistant, Hereditary/High Risk  Clinic  Hematology/Oncology, [facility]

## 2022-08-08 ENCOUNTER — DOCUMENTATION ONLY (OUTPATIENT)
Dept: HEMATOLOGY/ONCOLOGY | Facility: CLINIC | Age: 42
End: 2022-08-08
Payer: COMMERCIAL

## 2022-08-08 LAB
GENETIC COUNSELING?: YES
GENSO SPECIMEN TYPE: NORMAL
MISCELLANEOUS GENETIC TEST NAME: NORMAL
PARTENTAL OR SIBLING TESTING?: NO
REFERENCE LAB: NORMAL
TEST RESULT: NORMAL

## 2022-08-08 NOTE — PROGRESS NOTES
"Hereditary and High Risk Clinic  Department of Hematology and Oncology  [facility]    Cancer Genetics  Results    Name: Patrizia Abel ("Patrizia")  MRN: [id number]  GERMLINE GENETIC TESTING       The [facility] Multi-Cancer DNA+RNA Panel analyzes 84 genes associated with hereditary cancer:  AIP, ALK, APC, BOB, AXIN2, BAP1, BARD1, BLM, BMPR1A, BRCA1, BRCA2, BRIP1, CASR, CDC73, CDH1, CDK4, CDKN1B, CDKN1C, CDKN2A, CEBPA, CHEK2, CTNNA1, DICER1, DIS3L2, EGFR, EPCAM, FH, FLCN, GATA2, GPC3, GREM1, HOXB13, HRAS, KIT, MAX, MEN1, MET, MITF, MLH1, MSH2, MSH3, MSH6, MUTYH, NBN, NF1, NF2, NTHL1, PALB2, PDGFRA, PHOX2B, PMS2, POLD1, POLE, POT1, TCZNC8Y, PTCH1, PTEN, RAD50, RAD51C, RAD51D, RB1, RECQL4, RET, RUNX1, SDHA, SDHAF2, SDHB, SDHC, SDHD, SMAD4, SMARCA4, SMARCB1, SMARCE1, STK11, SUFU, TERC, TERT, XKVJ583, TP53, TSC1, TSC2, VHL, WRN, WT1.    RESULTS:  NEGATIVE FOR PATHOGENIC (harmful) VARIANTS  The test did not identify any genetic variants known to cause cancer.     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 Most VUS's (~91%) are eventually reclassified as benign (harmless). When the variant is reclassified, [facility] will issue an amended results report and notify the patient and genetics team. No actions are needed for a VUS other than checking in with [facility] or the genetics team periodically to see if it has been reclassified.     A Variant of Uncertain Significance, c.26G>A (p.Clh8Kiz), was identified in BMPR1A.  · The BMPR1A gene is associated with autosomal dominant juvenile polyposis syndrome (JPS) ([employer] UID: 81658).  · Not all variants present in a gene cause disease. The clinical significance of the " variant(s) identified in this gene is uncertain. Until this uncertainty can be resolved, caution should be exercised before using this result to inform clinical management decisions.  · Familial VUS testing is not offered. Testing family members for this variant will not contribute evidence to allow variant reclassification.     A Variant of Uncertain Significance, c.65A>C (p.Wmt40Qem), was identified in BRIP1  · The BRIP1 gene is associated with autosomal dominant predisposition to ovarian cancer (PMID: 90842736) and autosomal recessive Fanconi anemia ([facility] UID: 304851). There is also evidence suggesting BRIP1 is associated with autosomal dominant predisposition to breast and prostate cancer (PMID: 33464327, 64278918, 01263594, 74689522, 91863349). The data, however, are preliminary and insufficient to make a determination regarding these relationships.  · Not all variants present in a gene cause disease. The clinical significance of the variant(s) identified in this gene is uncertain. Until this uncertainty can be resolved, caution should be exercised before using this result to inform clinical management decisions.  · Familial VUS testing is not offered. Testing family members for this variant will not contribute evidence to allow variant reclassification.     Discussion:   Patrizia has no personal history of cancer.    Patrizia's family history is significant for:    Father: Colon cancer 80, leukemia, melanoma   Paternal aunt: Breast cancer   Mother: Colon cancer 65   Maternal uncles x 3: Colon cancer   Maternal uncle: Brain cancer   Maternal uncles x 2: Prostate cancer   Maternal aunt: Mesothelioma   Maternal aunts x 3: Thyroid cancer   Maternal cousin: Lung cancer ( at 45)   MGF: Lung cancer ( in his 40s)   MGM: Pancreatic cancer, melanoma    Patrizia tested negative for pathogenic (harmful) mutations in the genes commonly associated with hereditary colon, breast, brain, prostate, thyroid,  lung and pancreatic cancer, leukemia and melanoma.     In regards to her family history of cancer, this result is considered an uninformative negative, as it is unknown if her relatives with cancer have/had a genetic variant she did not inherit. Patrizia's affected relatives could consider seeing a genetics specialist for evaluation and possible genetic testing. If any of these individuals are  or decline testing, it may be appropriate for their children to proceed with a genetic risk assessment. The maternal family is highly suspicious for hereditary predisposition to cancer and her maternal relatives should strongly consider genetic testing.     Anyone interested in pursuing genetic counseling/testing can contact the [facility] for an in-personal or virtual appointment in [place] [phone] or [place] [phone]. Virtual patients must be located in [place] and able to access the virtual visit through the MyChart ([facility]) portal. Anyone who is located outside of [place] or prefers to see someone in-person closer to home can visit [email or web address] to locate a local genetic specialist.    Patient notification:   1. Phone call: The Genetics Navigator will notify the patient of the results and direct her to the results letter in [employer].   2. Results letter: A letter will be sent to the patient via [employer] that contains the test results and recommendations and advises the patient to notify me of any changes to her personal or family history and the genetic testing results of any relatives. Since genetics is an evolving field, she is also advised to check in with me periodically to see if updated testing is indicated.   3. Results report: The Genetics Navigator will ensure that Patrizia receives a copy of her [facility] results report and that a copy is available in Epic.     ASPEN Saavedra, PA-C  Hereditary/High Risk Clinic  Department of Hematology/Oncology  [facility]  [place]      CC: [facility]

## 2022-08-09 ENCOUNTER — PATIENT MESSAGE (OUTPATIENT)
Dept: HEMATOLOGY/ONCOLOGY | Facility: CLINIC | Age: 42
End: 2022-08-09
Payer: COMMERCIAL

## 2022-08-24 ENCOUNTER — PATIENT MESSAGE (OUTPATIENT)
Dept: INTERNAL MEDICINE | Facility: CLINIC | Age: 42
End: 2022-08-24
Payer: COMMERCIAL

## 2022-08-31 ENCOUNTER — PATIENT MESSAGE (OUTPATIENT)
Dept: INTERNAL MEDICINE | Facility: CLINIC | Age: 42
End: 2022-08-31
Payer: COMMERCIAL

## 2022-08-31 ENCOUNTER — PATIENT OUTREACH (OUTPATIENT)
Dept: INTERNAL MEDICINE | Facility: CLINIC | Age: 42
End: 2022-08-31
Payer: COMMERCIAL

## 2022-09-24 ENCOUNTER — IMMUNIZATION (OUTPATIENT)
Dept: PRIMARY CARE CLINIC | Facility: CLINIC | Age: 42
End: 2022-09-24
Payer: COMMERCIAL

## 2022-09-24 PROCEDURE — 90686 FLU VACCINE (QUAD) GREATER THAN OR EQUAL TO 3YO PRESERVATIVE FREE IM: ICD-10-PCS | Mod: S$GLB,,, | Performed by: NURSE PRACTITIONER

## 2022-09-24 PROCEDURE — 90471 FLU VACCINE (QUAD) GREATER THAN OR EQUAL TO 3YO PRESERVATIVE FREE IM: ICD-10-PCS | Mod: S$GLB,,, | Performed by: NURSE PRACTITIONER

## 2022-09-24 PROCEDURE — 90686 IIV4 VACC NO PRSV 0.5 ML IM: CPT | Mod: S$GLB,,, | Performed by: NURSE PRACTITIONER

## 2022-09-24 PROCEDURE — 90471 IMMUNIZATION ADMIN: CPT | Mod: S$GLB,,, | Performed by: NURSE PRACTITIONER

## 2022-09-27 ENCOUNTER — PATIENT MESSAGE (OUTPATIENT)
Dept: PRIMARY CARE CLINIC | Facility: CLINIC | Age: 42
End: 2022-09-27
Payer: COMMERCIAL

## 2022-10-10 ENCOUNTER — PATIENT MESSAGE (OUTPATIENT)
Dept: INTERNAL MEDICINE | Facility: CLINIC | Age: 42
End: 2022-10-10
Payer: COMMERCIAL

## 2023-01-18 ENCOUNTER — PATIENT MESSAGE (OUTPATIENT)
Dept: ADMINISTRATIVE | Facility: HOSPITAL | Age: 43
End: 2023-01-18
Payer: COMMERCIAL

## 2023-02-14 ENCOUNTER — OFFICE VISIT (OUTPATIENT)
Dept: OPTOMETRY | Facility: CLINIC | Age: 43
End: 2023-02-14
Payer: COMMERCIAL

## 2023-02-14 DIAGNOSIS — Z98.890 HX OF LASIK: ICD-10-CM

## 2023-02-14 DIAGNOSIS — H52.4 PRESBYOPIA: Primary | ICD-10-CM

## 2023-02-14 PROCEDURE — 99999 PR PBB SHADOW E&M-EST. PATIENT-LVL III: CPT | Mod: PBBFAC,,, | Performed by: OPTOMETRIST

## 2023-02-14 PROCEDURE — 92015 PR REFRACTION: ICD-10-PCS | Mod: S$GLB,,, | Performed by: OPTOMETRIST

## 2023-02-14 PROCEDURE — 92004 COMPRE OPH EXAM NEW PT 1/>: CPT | Mod: S$GLB,,, | Performed by: OPTOMETRIST

## 2023-02-14 PROCEDURE — 99999 PR PBB SHADOW E&M-EST. PATIENT-LVL III: ICD-10-PCS | Mod: PBBFAC,,, | Performed by: OPTOMETRIST

## 2023-02-14 PROCEDURE — 92015 DETERMINE REFRACTIVE STATE: CPT | Mod: S$GLB,,, | Performed by: OPTOMETRIST

## 2023-02-14 PROCEDURE — 92004 PR EYE EXAM, NEW PATIENT,COMPREHESV: ICD-10-PCS | Mod: S$GLB,,, | Performed by: OPTOMETRIST

## 2023-02-14 NOTE — PROGRESS NOTES
HPI    Presents today for routine eye exam. Pt reports blurry vision and dry   eyes. Pt report a couple of weeks ago she was seen in the urgent care for   abrasion OS and prescribed Tobradex.    S/P LASIK OU  Last edited by Itzel Smith MA on 2/14/2023  2:07 PM.        ROS    Positive for: Eyes (LASIK OU)  Negative for: Constitutional, Gastrointestinal, Neurological, Skin,   Genitourinary, Musculoskeletal, HENT, Endocrine, Cardiovascular,   Respiratory, Psychiatric, Allergic/Imm, Heme/Lymph  Last edited by Solo Wayne, OD on 2/14/2023  2:17 PM.        Assessment /Plan     For exam results, see Encounter Report.    Presbyopia    Hx of LASIK      Sp LASIK OU  Presby sx--otc readers OK  ESAU--advised SOOTHE XP Ats TID+    PLAN:    Rtc 1 yr

## 2023-04-12 ENCOUNTER — PATIENT MESSAGE (OUTPATIENT)
Dept: ADMINISTRATIVE | Facility: HOSPITAL | Age: 43
End: 2023-04-12
Payer: COMMERCIAL

## 2023-08-22 ENCOUNTER — PATIENT MESSAGE (OUTPATIENT)
Dept: DERMATOLOGY | Facility: CLINIC | Age: 43
End: 2023-08-22
Payer: COMMERCIAL

## 2023-09-23 ENCOUNTER — IMMUNIZATION (OUTPATIENT)
Dept: INTERNAL MEDICINE | Facility: CLINIC | Age: 43
End: 2023-09-23
Payer: COMMERCIAL

## 2023-09-23 PROCEDURE — 90471 FLU VACCINE (QUAD) GREATER THAN OR EQUAL TO 3YO PRESERVATIVE FREE IM: ICD-10-PCS | Mod: S$GLB,,, | Performed by: INTERNAL MEDICINE

## 2023-09-23 PROCEDURE — 90471 IMMUNIZATION ADMIN: CPT | Mod: S$GLB,,, | Performed by: INTERNAL MEDICINE

## 2023-09-23 PROCEDURE — 90686 FLU VACCINE (QUAD) GREATER THAN OR EQUAL TO 3YO PRESERVATIVE FREE IM: ICD-10-PCS | Mod: S$GLB,,, | Performed by: INTERNAL MEDICINE

## 2023-09-23 PROCEDURE — 90686 IIV4 VACC NO PRSV 0.5 ML IM: CPT | Mod: S$GLB,,, | Performed by: INTERNAL MEDICINE

## 2023-11-15 DIAGNOSIS — G44.221 CHRONIC TENSION-TYPE HEADACHE, INTRACTABLE: Primary | ICD-10-CM

## 2023-12-04 ENCOUNTER — CLINICAL SUPPORT (OUTPATIENT)
Dept: REHABILITATION | Facility: HOSPITAL | Age: 43
End: 2023-12-04
Attending: FAMILY MEDICINE
Payer: OTHER GOVERNMENT

## 2023-12-04 DIAGNOSIS — G44.221 CHRONIC TENSION-TYPE HEADACHE, INTRACTABLE: Primary | ICD-10-CM

## 2023-12-04 PROCEDURE — 97811 ACUP 1/> W/O ESTIM EA ADD 15: CPT | Mod: PN | Performed by: ACUPUNCTURIST

## 2023-12-04 PROCEDURE — 97810 ACUP 1/> WO ESTIM 1ST 15 MIN: CPT | Mod: PN | Performed by: ACUPUNCTURIST

## 2023-12-04 NOTE — PROGRESS NOTES
Acupuncture Evaluation Note     Name: Patrizia Abel  Clinic Number: [id number]    Traditional Chinese Medicine (TCM) Diagnosis: Qi Stagnation, Blood Stasis, and Qi Deficiency  Medical Diagnosis:   Encounter Diagnosis   Name Primary?    Chronic tension-type headache, intractable Yes        Evaluation Date: 12/4/2023    Visit #/Visits authorized: 1/12     Precautions: Standard    Subjective     Chief Concern: Tension headaches, orbital - behind right eye and occipital. Resurfaced 2-3 months ago.  Hx of migraines with light sensitivity onset 1999/2000 when starting college; hormone related and went away around time of first pregnancy about 9 years ago.      Medical necessity is demonstrated by the following IMPAIRMENTS: Medical Necessity: Decreased quality of life              Aggravating Factors:  prolonged sitting, weather changes, and stress   Relieving Factors:  rest    Symptom Description:     Quality:  Throbbing, Tight, and Sharp  Severity:  4  Frequency:  every day and at night/ wakes from sleep at night    Previous Treatments Tried:  Medication    HEENT:  Chronic sinusitis since childhood with ear congestion, pneumonia; on antibiotics 5-6x/year. Post-nasal drip with sore scratchy throat.    Chest:  history of pneumonia     Digestion:     Diet:    Fluids:   Taste/Appetite: can forget to eat with stress     Symptoms: loose stools and acid reflux     Sleep: wakes in the night with racing thoughts    Energy Levels:  fatigue/tired but wired     Psychological Symptoms:  anxiety     Other Symptoms: medial right ankle pain, has had 2 surgeries    GYN Symptoms: BCP, period every 12 weeks, on week 4 now, started 3 years ago after birth of 2nd child.     Objective     Observation:     Tongue:  swollen front with red tip, raised middle with sunken sides, peeled edges, pale/dusky    Body:     Color:     Coating:      Pulse:        thready       New Findings:      Treatment     Treatment Principles:  Invigorate qi and  blood, nourish qi, stop pain     Acupuncture points used:      Bilateral points: GB20, GB12, GB21, SI14, BL11, BL13, BL14, BL15, BL23, BL25  Unilateral points: SI3, LU7, HT7, KD6, KD3, SP6, BL62, BL60, BL65  Auricular Treatment:      Needles In: 29  Needles Out: 29  Needles W/O STIM placed: 9:30  Needles W/O STIM removed: 9:50      Other Traditional Chinese Medicine Modalities - Cupping  Gwasha    Assessment     After treatment, patient felt good     Patient prognosis is Good.     Patient will continue to benefit from acupuncture treatment to address the deficits listed in the problem list box on initial evaluation, provide patient family education and to maximize pt's level of independence in the home and community environment.     Patient's spiritual, cultural and educational needs considered and pt agreeable to plan of care and goals.     Anticipated barriers to treatment: none    Plan     Recommend 1 /week for 5 sessions then re-assess.      Education:  Patient is aware of cumulative benefit of acupuncture

## 2023-12-12 ENCOUNTER — CLINICAL SUPPORT (OUTPATIENT)
Dept: REHABILITATION | Facility: HOSPITAL | Age: 43
End: 2023-12-12
Payer: OTHER GOVERNMENT

## 2023-12-12 DIAGNOSIS — G44.221 CHRONIC TENSION-TYPE HEADACHE, INTRACTABLE: Primary | ICD-10-CM

## 2023-12-12 PROCEDURE — 97811 ACUP 1/> W/O ESTIM EA ADD 15: CPT | Mod: PN | Performed by: ACUPUNCTURIST

## 2023-12-12 PROCEDURE — 97810 ACUP 1/> WO ESTIM 1ST 15 MIN: CPT | Mod: PN | Performed by: ACUPUNCTURIST

## 2023-12-12 NOTE — PROGRESS NOTES
Acupuncture Evaluation Note     Name: Patrizia Abel  Clinic Number: [id number]    Traditional Chinese Medicine (TCM) Diagnosis: Qi Stagnation, Blood Stasis, and Qi Deficiency  Medical Diagnosis:   Encounter Diagnosis   Name Primary?    Chronic tension-type headache, intractable Yes        Evaluation Date: 12/12/2023    Visit #/Visits authorized: 2/12     Precautions: Standard    Subjective     Chief Concern: Tension headaches, orbital - behind right eye and occipital. Right side neck tension. Recurring sinusitis    Medical necessity is demonstrated by the following IMPAIRMENTS: Medical Necessity: Decreased quality of life              Aggravating Factors:  prolonged sitting, weather changes, and stress   Relieving Factors:  rest    Symptom Description:     Quality:  Throbbing, Tight, and Sharp  Severity:  4  Frequency:  every day and at night/ wakes from sleep at night        Objective     Observation:     Tongue:  swollen front with red tip, raised middle with sunken sides, peeled edges, pale/dusky    Body:     Color:     Coating:      Pulse:        thready       New Findings:      Treatment     Treatment Principles:  Invigorate qi and blood, nourish qi, stop pain     Acupuncture points used:      Bilateral points: LI20, ST25  Unilateral points: CV12, CV6, LU7, PC6, HT7, ST40, GB40, GB41, BL65, BL62, LV3, KD6, KD3, KD7, SP6, SP3, TW3, ling-gu/da-rm, SI3, LI11  Auricular Treatment:      Needles In: 26  Needles Out: 26  Needles W/O STIM placed: 2:50  Needles W/O STIM removed: 3:20      Other Traditional Chinese Medicine Modalities - Cupping  Gwasha    Assessment     After treatment, patient felt back pain improved      Patient prognosis is Good.     Patient will continue to benefit from acupuncture treatment to address the deficits listed in the problem list box on initial evaluation, provide patient family education and to maximize pt's level of independence in the home and community environment.      Patient's spiritual, cultural and educational needs considered and pt agreeable to plan of care and goals.     Anticipated barriers to treatment: none    Plan     Recommend 1 /week for 4 sessions then re-assess.      Education:  Patient is aware of cumulative benefit of acupuncture

## 2023-12-19 ENCOUNTER — CLINICAL SUPPORT (OUTPATIENT)
Dept: REHABILITATION | Facility: HOSPITAL | Age: 43
End: 2023-12-19
Payer: OTHER GOVERNMENT

## 2023-12-19 DIAGNOSIS — G44.221 CHRONIC TENSION-TYPE HEADACHE, INTRACTABLE: Primary | ICD-10-CM

## 2023-12-19 PROCEDURE — 97810 ACUP 1/> WO ESTIM 1ST 15 MIN: CPT | Mod: PN | Performed by: ACUPUNCTURIST

## 2023-12-19 PROCEDURE — 97811 ACUP 1/> W/O ESTIM EA ADD 15: CPT | Mod: PN | Performed by: ACUPUNCTURIST

## 2023-12-19 NOTE — PROGRESS NOTES
Acupuncture Evaluation Note     Name: Patrizia Abel  Clinic Number: [id number]    Traditional Chinese Medicine (TCM) Diagnosis: Qi Stagnation, Blood Stasis, Qi Deficiency, and Damp  Medical Diagnosis:   Encounter Diagnosis   Name Primary?    Chronic tension-type headache, intractable Yes        Evaluation Date: 12/19/2023    Visit #/Visits authorized: 3/12     Precautions: Standard    Subjective     Chief Concern: Tension headaches, orbital - behind right eye and occipital. Right side neck tension. Recurring sinusitis    Medical necessity is demonstrated by the following IMPAIRMENTS: Medical Necessity: Decreased quality of life              Aggravating Factors:  prolonged sitting, weather changes, and stress   Relieving Factors:  rest    Symptom Description:     Quality:  Throbbing, Tight, and Sharp  Severity:  4  Frequency:  every day and at night/ wakes from sleep at night        Objective     Observation:     Tongue:  swollen front with red tip, raised middle with sunken sides, peeled edges, pale/dusky    Body:     Color:     Coating:      Pulse:        thready       New Findings:      Treatment     Treatment Principles:  Invigorate qi and blood, nourish qi, strengthen middle to transform damp, stop pain     Acupuncture points used:      Bilateral points: LI20, BL62, SI3, LI11, LI4, ST36, GB34, SP9, LV3  Unilateral points: CV12, GB40, GB41, TW3, TW5, SP4, SP3  Auricular Treatment:      Needles In: 25  Needles Out: 25  Needles W/O STIM placed: 9:50  Needles W/O STIM removed: 10:20      Other Traditional Chinese Medicine Modalities - Cupping  [place]    Assessment     After treatment, patient felt back pain improved      Patient prognosis is Good.     Patient will continue to benefit from acupuncture treatment to address the deficits listed in the problem list box on initial evaluation, provide patient family education and to maximize pt's level of independence in the home and community environment.      Patient's spiritual, cultural and educational needs considered and pt agreeable to plan of care and goals.     Anticipated barriers to treatment: none    Plan     Recommend 1 /week for 3 sessions then re-assess.      Education:  Patient is aware of cumulative benefit of acupuncture

## 2024-01-26 NOTE — LETTER
August 8, 2022    Patrizia Abel  [address]  [facility] [address]     Dear Patrizia,    Below are the results from your recent cancer genetic testing. Please review and let us know if you have any questions or concerns. If you would like to schedule an in-person or a virtual appointment with me to further discuss the results, please message me through your [facility] patient portal or call [phone] to request a post-test genetic counseling appointment.    [facility] will be notified of your results.  A copy of the [facility] results report is available to you in your [facility] medical record and the [facility] patient portal. If you have any difficulty accessing your report, please let our office know so we can mail you a hard copy.    Germline Genetic Testing  The [facility] Multi-Cancer DNA+RNA Panel analyzes 84 genes associated with hereditary cancer:  AIP, ALK, APC, BOB, AXIN2, BAP1, BARD1, BLM, BMPR1A, BRCA1, BRCA2, BRIP1, CASR, CDC73, CDH1, CDK4, CDKN1B, CDKN1C, CDKN2A, CEBPA, CHEK2, CTNNA1, DICER1, DIS3L2, EGFR, EPCAM, FH, FLCN, GATA2, GPC3, GREM1, HOXB13, HRAS, KIT, MAX, MEN1, MET, MITF, MLH1, MSH2, MSH3, MSH6, MUTYH, NBN, NF1, NF2, NTHL1, PALB2, PDGFRA, PHOX2B, PMS2, POLD1, POLE, POT1, SCEEP4I, PTCH1, PTEN, RAD50, RAD51C, RAD51D, RB1, RECQL4, RET, RUNX1, SDHA, SDHAF2, SDHB, SDHC, SDHD, SMAD4, SMARCA4, SMARCB1, SMARCE1, STK11, SUFU, TERC, TERT, AXVY907, TP53, TSC1, TSC2, VHL, WRN, WT1.    RESULT:   NEGATIVE FOR PATHOGENIC (harmful) VARIANTS  The test did not identify any genetic variants known to cause cancer.     Variants of uncertain significance were detected in the BMPR1A and BRIP1 genes.    See your [facility] results report for more details.      A variant of uncertain significance (VUS) is a change in a gene that hasn't been seen or studied enough to determine if it is harmful or harmless.  Most VUS's (~91%) are eventually reclassified as benign (harmless). When the variant is  reclassified, [facility] will issue an amended results report and notify the patient and genetics team. No actions are needed for a VUS other than checking in with [facility] or the genetics team periodically to see if it has been reclassified.     What do these results mean?  You tested negative for  pathogenic (harmful) mutations in the genes commonly associated with hereditary colon, breast, brain, prostate, thyroid, lung and pancreatic cancer, leukemia, melanoma, and colon polyps.     In regards to your family history of cancer, this result is considered an uninformative negative, as it is unknown if your relatives with cancer have/had a genetic variant you did not inherit. Your relatives who have/had cancer could consider seeing a genetics specialist for evaluation and possible genetic testing. If any of these individuals are  or decline testing, it may be appropriate for their children to proceed with a genetic risk assessment. Your maternal family history remains highly suspicious for a predisposition to cancer, so your maternal relatives should strongly consider genetic testing.     Anyone interested in pursuing genetic counseling/testing can contact the [facility] for an in-personal or virtual appointment in [place] [phone] or [place] [phone]. Virtual patients must be located in [place] and able to access the virtual visit through the MyChart ([facility]) portal. Anyone who is located outside of [place] or prefers to see someone in-person closer to home can visit [email or web address] to locate a local genetic specialist.     Hereditary versus random/sporadic cancer:   Only a small percentage of cancers (5-10%) are hereditary, meaning they are caused by an inherited genetic variant (mutation). The remaining cancers (90-95%) are random or sporadic, caused often by a combination of risk factors including age, sex, race, physical characteristics and environmental and lifestyle  factors (diet, obesity, smoking, lack of exercise, etc). Family members often share these risk factors resulting in multiple individuals with cancer. Even though your test was negative, you may still be at risk for certain cancers based on factors such as family history, genetic causes not evaluated with this test, or other lifestyle and environmental influences.     Cancer screening and risk reduction  It is important that you and your relatives establish care with a primary care provider (PCP), whom you see at least annually for routine health maintenance and guidance on cancer screening and cancer risk reduction. Keep your PCP updated on your personal and family history. If you are not established with a PCP, please contact me so I can submit a referral.     The following cancer screening is recommended:   1. Breast cancer screening per average risk guidelines.   2. Ovarian, cervical and endometrial cancer screening.   3. Total-body skin cancer screening by a dermatologist due to your family history.   4. Colonoscopy at least every 5 years due to your family history.      Breast cancer screening guidelines for average risk:   1. Breast self-exams beginning at age 25. Promptly report any changes to your provider.   2. Annual clinical breast exam beginning at age 25.   3. Annual 3-D screening mammogram beginning at age 40.   4. What you can do to reduce your risk for breast cancer:   a. Limit alcohol to one drink per day. Alcoholic beverages increase your risk for breast cancer.   b. Increase your physical activity. Exercise reduces the risk for breast and other cancers.    c. Don't smoke.   d. Maintain a healthy weight. Evidence suggests that maintaining a body mass index (BMI) of 20 - 25 helps to reduce breast cancer risk.  e. The use of combined estrogen/progesterone therapy can increase the risk for breast cancer when used for 3 to 5+ years. If you are taking this, please discuss with the prescribing provider.      What you can do to reduce your risk for cancer:   Avoid tobacco use; exercise; maintain a healthy weight; eat a diet rich in fruits, vegetables, and whole grains and low in saturated/trans fat, red meat, and processed meat; limit alcohol consumption; protect against sexually transmitted infections; protect against sun exposure, avoid tanning beds; and get regular cancer screenings as recommended by your healthcare team.    Follow-up    Continue to follow up with all healthcare providers as directed.    Please update me through [facility] with any changes to your personal or family history and with any relative's genetic testing results. I'm happy to review their results to determine how they might affect you and other family members. Genetics is an evolving field, so I invite you to contact me periodically to determine if any updated genetic testing is recommended for you or your relatives.     Sincerely,  ASPEN Saavedra, PAJayC  Physician Assistant, Hereditary/High Risk Clinic  [facility]    Phone:  [phone]   No

## 2024-01-30 ENCOUNTER — CLINICAL SUPPORT (OUTPATIENT)
Dept: REHABILITATION | Facility: HOSPITAL | Age: 44
End: 2024-01-30
Payer: OTHER GOVERNMENT

## 2024-01-30 DIAGNOSIS — G44.221 CHRONIC TENSION-TYPE HEADACHE, INTRACTABLE: Primary | ICD-10-CM

## 2024-01-30 PROCEDURE — 97811 ACUP 1/> W/O ESTIM EA ADD 15: CPT | Mod: PN | Performed by: ACUPUNCTURIST

## 2024-01-30 PROCEDURE — 97810 ACUP 1/> WO ESTIM 1ST 15 MIN: CPT | Mod: PN | Performed by: ACUPUNCTURIST

## 2024-01-30 NOTE — PROGRESS NOTES
Acupuncture Evaluation Note     Name: Patrizia Abel  Clinic Number: [id number]    Traditional Chinese Medicine (TCM) Diagnosis: Qi Stagnation, Blood Stasis, Qi Deficiency, and Damp  Medical Diagnosis:   Encounter Diagnosis   Name Primary?    Chronic tension-type headache, intractable Yes        Evaluation Date: 1/30/2024    Visit #/Visits authorized: 1/12     Precautions: Standard    Subjective     Chief Concern: Recurring sinusitis with sinus/tension headaches    Medical necessity is demonstrated by the following IMPAIRMENTS: Medical Necessity: Decreased quality of life              Aggravating Factors:  prolonged sitting, weather changes, and stress   Relieving Factors:  rest    Symptom Description:     Quality:  Throbbing, Tight, and Sharp  Severity:  4  Frequency:  every day and at night/ wakes from sleep at night        Objective       New Findings:      Treatment     Treatment Principles:  Invigorate qi and blood, nourish qi, strengthen middle to transform damp, stop pain     Acupuncture points used:      Bilateral points: LI20, KD27, ST25  Unilateral points: CV12, GB40, TW5, SI3, LI4, BL62, BL65, GB34, ST36, KD6, KD3, LV3, SP6, SP9, LU7, HT7, LI11  Auricular Treatment:      Needles In: 23  Needles Out: 23  Needles W/O STIM placed: 9:20  Needles W/O STIM removed: 9:50      Assessment     After treatment, patient felt headaches are improving, continue with care.    Patient prognosis is Good.     Patient will continue to benefit from acupuncture treatment to address the deficits listed in the problem list box on initial evaluation, provide patient family education and to maximize pt's level of independence in the home and community environment.     Patient's spiritual, cultural and educational needs considered and pt agreeable to plan of care and goals.     Anticipated barriers to treatment: none    Plan     Recommend 1 /week for 5 sessions then re-assess.      Education:  Patient is aware of cumulative  benefit of acupuncture

## 2024-02-06 ENCOUNTER — CLINICAL SUPPORT (OUTPATIENT)
Dept: REHABILITATION | Facility: HOSPITAL | Age: 44
End: 2024-02-06
Payer: OTHER GOVERNMENT

## 2024-02-06 DIAGNOSIS — G44.221 CHRONIC TENSION-TYPE HEADACHE, INTRACTABLE: Primary | ICD-10-CM

## 2024-02-06 PROCEDURE — 97811 ACUP 1/> W/O ESTIM EA ADD 15: CPT | Mod: PN | Performed by: ACUPUNCTURIST

## 2024-02-06 PROCEDURE — 97810 ACUP 1/> WO ESTIM 1ST 15 MIN: CPT | Mod: PN | Performed by: ACUPUNCTURIST

## 2024-02-06 NOTE — PROGRESS NOTES
Acupuncture Evaluation Note     Name: Patrizia Abel  Clinic Number: [id number]    Traditional Chinese Medicine (TCM) Diagnosis: Qi Stagnation, Blood Stasis, Qi Deficiency, and Damp  Medical Diagnosis:   Encounter Diagnosis   Name Primary?    Chronic tension-type headache, intractable Yes        Evaluation Date: 2/6/2024    Visit #/Visits authorized: 2/12     Precautions: Standard    Subjective     Chief Concern: Recurring sinusitis with sinus/tension headaches    Medical necessity is demonstrated by the following IMPAIRMENTS: Medical Necessity: Decreased quality of life              Aggravating Factors:  prolonged sitting, weather changes, and stress   Relieving Factors:  rest    Symptom Description:     Quality:  Throbbing, Tight, and Sharp  Severity:  4  Frequency:  every day and at night/ wakes from sleep at night        Objective       New Findings:      Treatment     Treatment Principles:  Invigorate qi and blood, nourish qi, strengthen middle to transform damp, stop pain     Acupuncture points used:      Bilateral points: GB20, GB21, TW15, BL11, BL13, BL14, BL15, BL23, BL25  Unilateral points: BL62, BL65, BL60, BL58, GB34, KD6, KD3, KD7, SP6, LU7, HT7, PC6  Auricular Treatment:      Needles In: 30  Needles Out: 30  Needles W/O STIM placed: 8:50  [place] W/O STIM removed: 9:20      Assessment     After treatment, patient felt headaches are improving, continue with care.    Patient prognosis is Good.     Patient will continue to benefit from acupuncture treatment to address the deficits listed in the problem list box on initial evaluation, provide patient family education and to maximize pt's level of independence in the home and community environment.     Patient's spiritual, cultural and educational needs considered and pt agreeable to plan of care and goals.     Anticipated barriers to treatment: none    Plan     Recommend 1 /week for 4 sessions then re-assess.      Education:  Patient is aware of  cumulative benefit of acupuncture

## 2024-02-27 ENCOUNTER — CLINICAL SUPPORT (OUTPATIENT)
Dept: REHABILITATION | Facility: HOSPITAL | Age: 44
End: 2024-02-27
Payer: OTHER GOVERNMENT

## 2024-02-27 DIAGNOSIS — G44.221 CHRONIC TENSION-TYPE HEADACHE, INTRACTABLE: Primary | ICD-10-CM

## 2024-02-27 PROCEDURE — 97810 ACUP 1/> WO ESTIM 1ST 15 MIN: CPT | Mod: PN | Performed by: ACUPUNCTURIST

## 2024-02-27 PROCEDURE — 97811 ACUP 1/> W/O ESTIM EA ADD 15: CPT | Mod: PN | Performed by: ACUPUNCTURIST

## 2024-02-27 NOTE — PROGRESS NOTES
Acupuncture Evaluation Note     Name: Patrizia Abel  Clinic Number: [id number]    Traditional Chinese Medicine (TCM) Diagnosis: Qi Stagnation, Blood Stasis, Qi Deficiency, and Damp  Medical Diagnosis:   Encounter Diagnosis   Name Primary?    Chronic tension-type headache, intractable Yes        Evaluation Date: 2/27/2024    Visit #/Visits authorized: 3/12     Precautions: Standard    Subjective     Chief Concern: Recurring sinusitis with sinus/tension headaches    Medical necessity is demonstrated by the following IMPAIRMENTS: Medical Necessity: Decreased quality of life              Aggravating Factors:  prolonged sitting, weather changes, and stress   Relieving Factors:  rest    Symptom Description:     Quality:  Throbbing, Tight, and Sharp  Severity:  4  Frequency:  every day and at night/ wakes from sleep at night        Objective       New Findings:      Treatment     Treatment Principles:  Invigorate qi and blood, nourish qi, strengthen middle to transform damp, stop pain     Acupuncture points used:    SSC  Bilateral points: LI20, BL7  Unilateral points: SI3, TW5, LI4, LI11, LU7, PC6, BL62, GB40, ST36, GB34, KD6, KD2, KD3, SP6, SP4, LV3, SP9, KD7  Auricular Treatment:      Needles In: 27  Needles Out: 27  Needles W/O STIM placed: 8:50  [place] W/O STIM removed: 9:20      Assessment     After treatment, patient felt symptoms are improving with acupuncture    Patient prognosis is Good.     Patient will continue to benefit from acupuncture treatment to address the deficits listed in the problem list box on initial evaluation, provide patient family education and to maximize pt's level of independence in the home and community environment.     Patient's spiritual, cultural and educational needs considered and pt agreeable to plan of care and goals.     Anticipated barriers to treatment: none    Plan     Recommend 1 /week for 3 sessions then re-assess.      Education:  Patient is aware of cumulative benefit  of acupuncture

## 2024-03-05 ENCOUNTER — CLINICAL SUPPORT (OUTPATIENT)
Dept: REHABILITATION | Facility: HOSPITAL | Age: 44
End: 2024-03-05
Payer: OTHER GOVERNMENT

## 2024-03-05 DIAGNOSIS — G44.221 CHRONIC TENSION-TYPE HEADACHE, INTRACTABLE: Primary | ICD-10-CM

## 2024-03-05 PROCEDURE — 97810 ACUP 1/> WO ESTIM 1ST 15 MIN: CPT | Mod: PN | Performed by: ACUPUNCTURIST

## 2024-03-05 PROCEDURE — 97811 ACUP 1/> W/O ESTIM EA ADD 15: CPT | Mod: PN | Performed by: ACUPUNCTURIST

## 2024-03-05 NOTE — PROGRESS NOTES
Acupuncture Evaluation Note     Name: Patrizia Abel  Clinic Number: [id number]    Traditional Chinese Medicine (TCM) Diagnosis: Qi Stagnation, Blood Stasis, Qi Deficiency, and Damp  Medical Diagnosis:   Encounter Diagnosis   Name Primary?    Chronic tension-type headache, intractable Yes        Evaluation Date: 3/5/2024    Visit #/Visits authorized: 4/12     Precautions: Standard    Subjective     Chief Concern: Recurring sinusitis with sinus/tension headaches    Medical necessity is demonstrated by the following IMPAIRMENTS: Medical Necessity: Decreased quality of life              Aggravating Factors:  prolonged sitting, weather changes, and stress   Relieving Factors:  rest    Symptom Description:     Quality:  Throbbing, Tight, and Sharp  Severity:  4  Frequency:  every day and at night/ wakes from sleep at night        Objective       New Findings:      Treatment     Treatment Principles:  Invigorate qi and blood, nourish qi, strengthen middle to transform damp, stop pain     Acupuncture points used:     Bilateral points: LI20, KD27, ST25  Unilateral points: SI3, TW3, LI4, LU7, PC6, HT7, BL62, GB40, ST36, GB34, KD6, KD3, KD7, SP6, LV3, LV7, CV17, CV12, CV6  Auricular Treatment:      Needles In: 25  Needles Out: 25  Needles W/O STIM placed: 8:50  [place] W/O STIM removed: 9:20      Assessment     After treatment, patient felt symptoms are improving with acupuncture    Patient prognosis is Good.     Patient will continue to benefit from acupuncture treatment to address the deficits listed in the problem list box on initial evaluation, provide patient family education and to maximize pt's level of independence in the home and community environment.     Patient's spiritual, cultural and educational needs considered and pt agreeable to plan of care and goals.     Anticipated barriers to treatment: none    Plan     Recommend 1 /week for 2 sessions then re-assess.      Education:  Patient is aware of cumulative  benefit of acupuncture

## 2024-03-19 ENCOUNTER — CLINICAL SUPPORT (OUTPATIENT)
Dept: REHABILITATION | Facility: HOSPITAL | Age: 44
End: 2024-03-19
Payer: COMMERCIAL

## 2024-03-19 DIAGNOSIS — G44.221 CHRONIC TENSION-TYPE HEADACHE, INTRACTABLE: Primary | ICD-10-CM

## 2024-03-19 PROCEDURE — 97810 ACUP 1/> WO ESTIM 1ST 15 MIN: CPT | Mod: PN | Performed by: ACUPUNCTURIST

## 2024-03-19 PROCEDURE — 97811 ACUP 1/> W/O ESTIM EA ADD 15: CPT | Mod: PN | Performed by: ACUPUNCTURIST

## 2024-03-19 NOTE — PROGRESS NOTES
Acupuncture Evaluation Note     Name: Patrizia Abel  Clinic Number: [id number]    Traditional Chinese Medicine (TCM) Diagnosis: Qi Stagnation, Blood Stasis, Qi Deficiency, and Damp  Medical Diagnosis:   Encounter Diagnosis   Name Primary?    Chronic tension-type headache, intractable Yes        Evaluation Date: 3/19/2024    Visit #/Visits authorized: 12, 5/12     Precautions: Standard    Subjective     Chief Concern: Recurring sinusitis with sinus/tension headaches    Medical necessity is demonstrated by the following IMPAIRMENTS: Medical Necessity: Decreased quality of life              Aggravating Factors:  prolonged sitting, weather changes, and stress   Relieving Factors:  rest    Symptom Description:     Quality:  Throbbing, Tight, and Sharp  Severity:  4  Frequency:  every day and at night/ wakes from sleep at night        Objective       New Findings:      Treatment     Treatment Principles:  Invigorate qi and blood, nourish qi, strengthen middle to transform damp, stop pain     Acupuncture points used:     Bilateral points: LI20, [place], BL7  Unilateral points: SI3, TW3, LI4, LU7, PC6, HT7, BL62, GB40, ST36, GB34, KD6, KD3, SP6, LV3  Auricular Treatment:      Needles In: 20  Needles Out: 20  Needles W/O STIM placed: 8:50  [place] W/O STIM removed: 9:20      Assessment     After treatment, patient felt symptoms are improving with acupuncture    Patient prognosis is Good.     Patient will continue to benefit from acupuncture treatment to address the deficits listed in the problem list box on initial evaluation, provide patient family education and to maximize pt's level of independence in the home and community environment.     Patient's spiritual, cultural and educational needs considered and pt agreeable to plan of care and goals.     Anticipated barriers to treatment: none    Plan     Recommend 1 /week for 1 sessions then re-assess.      Education:  Patient is aware of cumulative benefit of  acupuncture

## 2024-03-26 ENCOUNTER — CLINICAL SUPPORT (OUTPATIENT)
Dept: REHABILITATION | Facility: HOSPITAL | Age: 44
End: 2024-03-26
Payer: OTHER GOVERNMENT

## 2024-03-26 DIAGNOSIS — G44.221 CHRONIC TENSION-TYPE HEADACHE, INTRACTABLE: Primary | ICD-10-CM

## 2024-03-26 PROCEDURE — 97811 ACUP 1/> W/O ESTIM EA ADD 15: CPT | Mod: PN | Performed by: ACUPUNCTURIST

## 2024-03-26 PROCEDURE — 97810 ACUP 1/> WO ESTIM 1ST 15 MIN: CPT | Mod: PN | Performed by: ACUPUNCTURIST

## 2024-03-27 NOTE — PROGRESS NOTES
Acupuncture Evaluation Note     Name: Patrizia Abel  Clinic Number: [id number]    Traditional Chinese Medicine (TCM) Diagnosis: Qi Stagnation, Blood Stasis, Qi Deficiency, and Damp  Medical Diagnosis:   Encounter Diagnosis   Name Primary?    Chronic tension-type headache, intractable Yes        Evaluation Date: 3/27/2024    Visit #/Visits authorized: 12, 6/12     Precautions: Standard    Subjective     Chief Concern: Recurring sinusitis with sinus/tension headaches, fatigue    Medical necessity is demonstrated by the following IMPAIRMENTS: Medical Necessity: Decreased quality of life              Aggravating Factors:  prolonged sitting, weather changes, and stress   Relieving Factors:  rest    Symptom Description:     Quality:  Throbbing, Tight, and Sharp  Severity:  4  Frequency:  every day and at night/ wakes from sleep at night        Objective       New Findings:      Treatment     Treatment Principles:  Invigorate qi and blood, nourish qi, strengthen middle to transform damp, stop pain     Acupuncture points used:   SSC  Bilateral points: LI20, [place]  Unilateral points: SI3, TW3, LI4, LI11, LU7, PC6, HT7, BL62, GB40, ST36, ST40, GB34, KD6, KD3, SP6, LV3, SP9, CV12  Auricular Treatment:      Needles In: 27  Needles Out: 27  Needles W/O STIM placed: 2:50  Needles W/O STIM removed: 3:20      Assessment     After treatment, patient felt symptoms are improving with acupuncture    Patient prognosis is Good.     Patient will continue to benefit from acupuncture treatment to address the deficits listed in the problem list box on initial evaluation, provide patient family education and to maximize pt's level of independence in the home and community environment.     Patient's spiritual, cultural and educational needs considered and pt agreeable to plan of care and goals.     Anticipated barriers to treatment: none    Plan     Recommend 1 /week for 6 sessions then re-assess.      Education:  Patient is aware of  cumulative benefit of acupuncture

## 2024-04-01 ENCOUNTER — CLINICAL SUPPORT (OUTPATIENT)
Dept: REHABILITATION | Facility: HOSPITAL | Age: 44
End: 2024-04-01
Payer: OTHER GOVERNMENT

## 2024-04-01 DIAGNOSIS — G44.221 CHRONIC TENSION-TYPE HEADACHE, INTRACTABLE: Primary | ICD-10-CM

## 2024-04-01 PROCEDURE — 97810 ACUP 1/> WO ESTIM 1ST 15 MIN: CPT | Mod: PN | Performed by: ACUPUNCTURIST

## 2024-04-01 PROCEDURE — 97811 ACUP 1/> W/O ESTIM EA ADD 15: CPT | Mod: PN | Performed by: ACUPUNCTURIST

## 2024-04-01 NOTE — PROGRESS NOTES
Acupuncture Evaluation Note     Name: Patrizia Abel  Clinic Number: [id number]    Traditional Chinese Medicine (TCM) Diagnosis: Qi Stagnation, Blood Stasis, Qi Deficiency, and Damp  Medical Diagnosis:   Encounter Diagnosis   Name Primary?    Chronic tension-type headache, intractable Yes        Evaluation Date: 4/1/2024    Visit #/Visits authorized: 12, 7/12     Precautions: Standard    Subjective     Chief Concern: Recurring sinusitis with sinus/tension headaches, fatigue, stress    Medical necessity is demonstrated by the following IMPAIRMENTS: Medical Necessity: Decreased quality of life              Aggravating Factors:  prolonged sitting, weather changes, and stress   Relieving Factors:  rest    Symptom Description:     Quality:  Throbbing, Tight, and Sharp  Severity:  4  Frequency:  every day and at night/ wakes from sleep at night        Objective       New Findings:      Treatment     Treatment Principles:  Invigorate qi and blood, nourish qi, strengthen middle to transform damp, stop pain     Acupuncture points used:     Bilateral points: LI20, LI11, 4 [place], ST36, GB34, LV8, SP6, KD3, ST25  Unilateral points: LU7, KD6, PC6, SP4, CV12, CV6, ST40  Auricular Treatment:      Needles In: 27  Needles Out: 27  Needles W/O STIM placed: 8:50  [place] W/O STIM removed: 9:20      Assessment     After treatment, patient felt symptoms are improving with acupuncture    Patient prognosis is Good.     Patient will continue to benefit from acupuncture treatment to address the deficits listed in the problem list box on initial evaluation, provide patient family education and to maximize pt's level of independence in the home and community environment.     Patient's spiritual, cultural and educational needs considered and pt agreeable to plan of care and goals.     Anticipated barriers to treatment: none    Plan     Recommend 1 /week for 5 sessions then re-assess.      Education:  Patient is aware of cumulative benefit  of acupuncture

## 2024-04-22 DIAGNOSIS — G44.221 CHRONIC TENSION-TYPE HEADACHE, INTRACTABLE: Primary | ICD-10-CM

## 2024-05-06 ENCOUNTER — CLINICAL SUPPORT (OUTPATIENT)
Dept: REHABILITATION | Facility: HOSPITAL | Age: 44
End: 2024-05-06
Payer: OTHER GOVERNMENT

## 2024-05-06 DIAGNOSIS — G44.221 CHRONIC TENSION-TYPE HEADACHE, INTRACTABLE: Primary | ICD-10-CM

## 2024-05-06 PROCEDURE — 97810 ACUP 1/> WO ESTIM 1ST 15 MIN: CPT | Mod: PN | Performed by: ACUPUNCTURIST

## 2024-05-06 PROCEDURE — 97811 ACUP 1/> W/O ESTIM EA ADD 15: CPT | Mod: PN | Performed by: ACUPUNCTURIST

## 2024-05-06 NOTE — PROGRESS NOTES
Acupuncture Evaluation Note     Name: Patrizia Abel  Clinic Number: [id number]    Traditional Chinese Medicine (TCM) Diagnosis: Qi Stagnation, Blood Stasis, Qi Deficiency, and Damp  Medical Diagnosis:   Encounter Diagnosis   Name Primary?    Chronic tension-type headache, intractable Yes        Evaluation Date: 5/6/2024    Visit #/Visits authorized: 12, 8/12     Precautions: Standard    Subjective     Chief Concern: Recurring sinusitis with sinus/tension headaches, fatigue, stress    Medical necessity is demonstrated by the following IMPAIRMENTS: Medical Necessity: Decreased quality of life              Aggravating Factors:  prolonged sitting, weather changes, and stress   Relieving Factors:  rest    Symptom Description:     Quality:  Throbbing, Tight, and Sharp  Severity:  4  Frequency:  every day and at night/ wakes from sleep at night        Objective       New Findings:      Treatment     Treatment Principles:  Invigorate qi and blood, nourish qi, strengthen middle to transform damp, stop pain     Acupuncture points used:   [place], DU20  Bilateral points:   Unilateral points: LU7, KD6, PC6, SP4, CV12, ST40, KD3, SP6, SP9, LV3, BL62, BL65, GB40, GB34, ST36, HT7, SI3, TW3, TW5, LI4, LI11  Auricular Treatment:      Needles In: 23  Needles Out: 23  Needles W/O STIM placed: 9:50  Needles W/O STIM removed: 10:20      Assessment     After treatment, patient felt symptoms improve with acupuncture, continue with care as needed    Patient prognosis is Good.     Patient will continue to benefit from acupuncture treatment to address the deficits listed in the problem list box on initial evaluation, provide patient family education and to maximize pt's level of independence in the home and community environment.     Patient's spiritual, cultural and educational needs considered and pt agreeable to plan of care and goals.     Anticipated barriers to treatment: none    Plan     Recommend 1 /week for 4 sessions then  re-assess.      Education:  Patient is aware of cumulative benefit of acupuncture

## 2024-05-13 ENCOUNTER — CLINICAL SUPPORT (OUTPATIENT)
Dept: REHABILITATION | Facility: HOSPITAL | Age: 44
End: 2024-05-13
Payer: OTHER GOVERNMENT

## 2024-05-13 DIAGNOSIS — G44.221 CHRONIC TENSION-TYPE HEADACHE, INTRACTABLE: Primary | ICD-10-CM

## 2024-05-13 PROCEDURE — 97810 ACUP 1/> WO ESTIM 1ST 15 MIN: CPT | Mod: PN | Performed by: ACUPUNCTURIST

## 2024-05-13 PROCEDURE — 97811 ACUP 1/> W/O ESTIM EA ADD 15: CPT | Mod: PN | Performed by: ACUPUNCTURIST

## 2024-05-13 NOTE — PROGRESS NOTES
Acupuncture Evaluation Note     Name: Patrizia Abel  Clinic Number: [id number]    Traditional Chinese Medicine (TCM) Diagnosis: Qi Stagnation, Blood Stasis, Qi Deficiency, and Damp  Medical Diagnosis:   Encounter Diagnosis   Name Primary?    Chronic tension-type headache, intractable Yes        Evaluation Date: 5/13/2024    Visit #/Visits authorized: 12, 9/12     Precautions: Standard    Subjective     Chief Concern: Recurring sinusitis with sinus/tension headaches, fatigue, stress. Low back pain and neck pain with headache.     Medical necessity is demonstrated by the following IMPAIRMENTS: Medical Necessity: Decreased quality of life              Aggravating Factors:  prolonged sitting, weather changes, and stress   Relieving Factors:  rest    Symptom Description:     Quality:  Throbbing, Tight, and Sharp  Severity:  4  Frequency:  every day and at night/ wakes from sleep at night        Objective       New Findings:      Treatment     Treatment Principles:  Invigorate qi and blood, nourish qi, strengthen middle to transform damp, stop pain     Acupuncture points used:     Bilateral points: GB20, GB21, SI10, BL11, BL12, BL13, BL14, BL15, BL23, BL25, BL26, SI14  Unilateral points: LU7, SI3, BL62, KD6, BL58, KD3  Auricular Treatment:      Needles In: 30  Needles Out: 30  Needles W/O STIM placed: 8:50  [place] W/O STIM removed: 9:20      Assessment     After treatment, patient felt symptoms improve with acupuncture, continue with care as needed    Patient prognosis is Good.     Patient will continue to benefit from acupuncture treatment to address the deficits listed in the problem list box on initial evaluation, provide patient family education and to maximize pt's level of independence in the home and community environment.     Patient's spiritual, cultural and educational needs considered and pt agreeable to plan of care and goals.     Anticipated barriers to treatment: none    Plan     Recommend 1 /week for  3 sessions then re-assess.      Education:  Patient is aware of cumulative benefit of acupuncture

## 2024-05-20 ENCOUNTER — CLINICAL SUPPORT (OUTPATIENT)
Dept: REHABILITATION | Facility: HOSPITAL | Age: 44
End: 2024-05-20
Payer: OTHER GOVERNMENT

## 2024-05-20 DIAGNOSIS — G44.221 CHRONIC TENSION-TYPE HEADACHE, INTRACTABLE: Primary | ICD-10-CM

## 2024-05-20 PROCEDURE — 97811 ACUP 1/> W/O ESTIM EA ADD 15: CPT | Mod: PN | Performed by: ACUPUNCTURIST

## 2024-05-20 PROCEDURE — 97810 ACUP 1/> WO ESTIM 1ST 15 MIN: CPT | Mod: PN | Performed by: ACUPUNCTURIST

## 2024-05-20 NOTE — PROGRESS NOTES
Acupuncture Evaluation Note     Name: Patrizia Abel  Clinic Number: [id number]    Traditional Chinese Medicine (TCM) Diagnosis: Qi Stagnation, Blood Stasis, Qi Deficiency, and Damp  Medical Diagnosis:   Encounter Diagnosis   Name Primary?    Chronic tension-type headache, intractable Yes        Evaluation Date: 5/20/2024    Visit #/Visits authorized: 12, 10/12     Precautions: Standard    Subjective     Chief Concern: Recurring sinusitis with sinus/tension headaches, fatigue, anxiety.    Medical necessity is demonstrated by the following IMPAIRMENTS: Medical Necessity: Decreased quality of life              Aggravating Factors:  prolonged sitting, weather changes, and stress   Relieving Factors:  rest    Symptom Description:     Quality:  Throbbing, Tight, and Sharp  Severity:  4  Frequency:  every day and at night/ wakes from sleep at night        Objective       New Findings:      Treatment     Treatment Principles:  Invigorate qi and blood, nourish qi, strengthen middle to transform damp, stop pain     Acupuncture points used:   DU20  Bilateral points: 4 Lo, LI11, ST36, GB34, SP9, SP6, KD3, LI20  Unilateral points: LU7, KD6, SP4, PC6, HT7, HT3, GB41  Auricular Treatment:      Needles In: 26  Needles Out: 26  Needles W/O STIM placed: 8:50  [place] W/O STIM removed: 9:20      Assessment     After treatment, patient felt symptoms improve with acupuncture, continue with care as needed    Patient prognosis is Good.     Patient will continue to benefit from acupuncture treatment to address the deficits listed in the problem list box on initial evaluation, provide patient family education and to maximize pt's level of independence in the home and community environment.     Patient's spiritual, cultural and educational needs considered and pt agreeable to plan of care and goals.     Anticipated barriers to treatment: none    Plan     Recommend 1 /week for 2 sessions then re-assess.      Education:  Patient is  aware of cumulative benefit of acupuncture

## 2024-06-03 ENCOUNTER — CLINICAL SUPPORT (OUTPATIENT)
Dept: REHABILITATION | Facility: HOSPITAL | Age: 44
End: 2024-06-03
Payer: COMMERCIAL

## 2024-06-03 DIAGNOSIS — G44.221 CHRONIC TENSION-TYPE HEADACHE, INTRACTABLE: Primary | ICD-10-CM

## 2024-06-03 PROCEDURE — 97811 ACUP 1/> W/O ESTIM EA ADD 15: CPT | Mod: PN | Performed by: ACUPUNCTURIST

## 2024-06-03 PROCEDURE — 97810 ACUP 1/> WO ESTIM 1ST 15 MIN: CPT | Mod: PN | Performed by: ACUPUNCTURIST

## 2024-06-03 NOTE — PROGRESS NOTES
Acupuncture Evaluation Note     Name: Patrizia Abel  Clinic Number: [id number]    Traditional Chinese Medicine (TCM) Diagnosis: Qi Stagnation, Blood Stasis, Qi Deficiency, and Damp  Medical Diagnosis:   Encounter Diagnosis   Name Primary?    Chronic tension-type headache, intractable Yes        Evaluation Date: 6/3/2024    Visit #/Visits authorized: 12, 11/12     Precautions: Standard    Subjective     Chief Concern: Recurring sinusitis with sinus/tension headaches, fatigue, anxiety.    Medical necessity is demonstrated by the following IMPAIRMENTS: Medical Necessity: Decreased quality of life              Aggravating Factors:  prolonged sitting, weather changes, and stress   Relieving Factors:  rest    Symptom Description:     Quality:  Throbbing, Tight, and Sharp  Severity:  4  Frequency:  every day and at night/ wakes from sleep at night        Objective       New Findings:      Treatment     Treatment Principles:  Invigorate qi and blood, nourish qi, strengthen middle to transform damp, stop pain     Acupuncture points used:   SSC  Bilateral points: LI20  Unilateral points: LU7, KD6, BL62, SI3, TW3, LI4, HT7, PC6, GB40, BL65, BL62, KD3, SP6, SP4, LV3  Auricular Treatment:      Needles In: 22  Needles Out: 22  Needles W/O STIM placed: 8:50  [place] W/O STIM removed: 9:20      Assessment     After treatment, patient felt symptoms improve with acupuncture, continue with care as needed    Patient prognosis is Good.     Patient will continue to benefit from acupuncture treatment to address the deficits listed in the problem list box on initial evaluation, provide patient family education and to maximize pt's level of independence in the home and community environment.     Patient's spiritual, cultural and educational needs considered and pt agreeable to plan of care and goals.     Anticipated barriers to treatment: none    Plan     Recommend 1 /week for 1 sessions then re-assess.      Education:  Patient is  aware of cumulative benefit of acupuncture

## 2024-06-24 ENCOUNTER — CLINICAL SUPPORT (OUTPATIENT)
Dept: REHABILITATION | Facility: HOSPITAL | Age: 44
End: 2024-06-24
Payer: OTHER GOVERNMENT

## 2024-06-24 DIAGNOSIS — G44.221 CHRONIC TENSION-TYPE HEADACHE, INTRACTABLE: Primary | ICD-10-CM

## 2024-06-24 PROCEDURE — 97810 ACUP 1/> WO ESTIM 1ST 15 MIN: CPT | Mod: PN | Performed by: ACUPUNCTURIST

## 2024-06-24 PROCEDURE — 97811 ACUP 1/> W/O ESTIM EA ADD 15: CPT | Mod: PN | Performed by: ACUPUNCTURIST

## 2024-06-24 NOTE — PROGRESS NOTES
Acupuncture Evaluation Note     Name: Patrizia Abel  Clinic Number: [id number]    Traditional Chinese Medicine (TCM) Diagnosis: Qi Stagnation, Blood Stasis, Qi Deficiency, and Damp  Medical Diagnosis:   No diagnosis found.       Evaluation Date: 6/24/2024    Visit #/Visits authorized: 8, 5/8     Precautions: Standard    Subjective     Chief Concern: Recurring sinusitis with sinus/tension headaches, fatigue, anxiety/stress.    Medical necessity is demonstrated by the following IMPAIRMENTS: Medical Necessity: Decreased quality of life              Aggravating Factors:  prolonged sitting, weather changes, and stress   Relieving Factors:  rest    Symptom Description:     Quality:  Throbbing, Tight, and Sharp  Severity:  4  Frequency:  every day and at night/ wakes from sleep at night        Objective       New Findings:      Treatment     Treatment Principles:  Invigorate qi and blood, nourish qi, strengthen middle to transform damp, stop pain     Acupuncture points used:     Bilateral points: LI20, [place], 4 Lo, ST36, GB34, SP9, SP6  Unilateral points: CV12, LU7, SP4, KD6, PC6, LI11, HT7  Auricular Treatment:      Needles In: 23  Needles Out: 23  Needles W/O STIM placed: 8:50  [place] W/O STIM removed: 9:20      Assessment     After treatment, patient felt symptoms improve with acupuncture, continue with care as needed    Patient prognosis is Good.     Patient will continue to benefit from acupuncture treatment to address the deficits listed in the problem list box on initial evaluation, provide patient family education and to maximize pt's level of independence in the home and community environment.     Patient's spiritual, cultural and educational needs considered and pt agreeable to plan of care and goals.     Anticipated barriers to treatment: none    Plan     Recommend 1 /week for 3 sessions then re-assess.      Education:  Patient is aware of cumulative benefit of acupuncture

## 2024-07-01 ENCOUNTER — CLINICAL SUPPORT (OUTPATIENT)
Dept: REHABILITATION | Facility: HOSPITAL | Age: 44
End: 2024-07-01
Payer: OTHER GOVERNMENT

## 2024-07-01 DIAGNOSIS — G44.221 CHRONIC TENSION-TYPE HEADACHE, INTRACTABLE: Primary | ICD-10-CM

## 2024-07-01 PROCEDURE — 97811 ACUP 1/> W/O ESTIM EA ADD 15: CPT | Mod: PN | Performed by: ACUPUNCTURIST

## 2024-07-01 PROCEDURE — 97810 ACUP 1/> WO ESTIM 1ST 15 MIN: CPT | Mod: PN | Performed by: ACUPUNCTURIST

## 2024-07-01 NOTE — PROGRESS NOTES
Acupuncture Evaluation Note     Name: Patrizia Abel  Clinic Number: [id number]    Traditional Chinese Medicine (TCM) Diagnosis: Qi Stagnation, Blood Stasis, Qi Deficiency, and Damp  Medical Diagnosis:   Encounter Diagnosis   Name Primary?    Chronic tension-type headache, intractable Yes          Evaluation Date: 7/1/2024    Visit #/Visits authorized: 8, 6/8     Precautions: Standard    Subjective     Chief Concern: Recurring sinusitis with sinus/tension headaches, fatigue, anxiety/stress. Low back pain and upper back/shoulder tension.     Medical necessity is demonstrated by the following IMPAIRMENTS: Medical Necessity: Decreased quality of life              Aggravating Factors:  prolonged sitting, weather changes, and stress   Relieving Factors:  rest    Symptom Description:     Quality:  Throbbing, Tight, and Sharp  Severity:  4  Frequency:  every day and at night/ wakes from sleep at night        Objective       New Findings:      Treatment     Treatment Principles:  Invigorate qi and blood, nourish qi, strengthen middle to transform damp, stop pain     Acupuncture points used:     Bilateral points: BL12, BL23, BL25, BL26, BL27, BL28, yaoyan   Unilateral points: BL62, BL60, BL58, KD6, SP6  Auricular Treatment:      Needles In: 19  Needles Out: 19  Needles W/O STIM placed: 8:20  Needles W/O STIM removed: 8:50      Assessment     After treatment, patient felt symptoms improve with acupuncture, continue with care as needed    Patient prognosis is Good.     Patient will continue to benefit from acupuncture treatment to address the deficits listed in the problem list box on initial evaluation, provide patient family education and to maximize pt's level of independence in the home and community environment.     Patient's spiritual, cultural and educational needs considered and pt agreeable to plan of care and goals.     Anticipated barriers to treatment: none    Plan     Recommend 1 /week for 2 sessions then  re-assess.      Education:  Patient is aware of cumulative benefit of acupuncture

## 2024-08-12 ENCOUNTER — CLINICAL SUPPORT (OUTPATIENT)
Dept: REHABILITATION | Facility: HOSPITAL | Age: 44
End: 2024-08-12
Payer: OTHER GOVERNMENT

## 2024-08-12 DIAGNOSIS — G44.221 CHRONIC TENSION-TYPE HEADACHE, INTRACTABLE: Primary | ICD-10-CM

## 2024-08-12 PROCEDURE — 97810 ACUP 1/> WO ESTIM 1ST 15 MIN: CPT | Mod: PN | Performed by: ACUPUNCTURIST

## 2024-08-12 PROCEDURE — 97811 ACUP 1/> W/O ESTIM EA ADD 15: CPT | Mod: PN | Performed by: ACUPUNCTURIST

## 2024-08-12 NOTE — PROGRESS NOTES
Acupuncture Evaluation Note     Name: Patrizia Abel  Clinic Number: [id number]    Traditional Chinese Medicine (TCM) Diagnosis: Qi Stagnation, Blood Stasis, Qi Deficiency, and Damp  Medical Diagnosis:   Encounter Diagnosis   Name Primary?    Chronic tension-type headache, intractable Yes          Evaluation Date: 8/12/2024    Visit #/Visits authorized: 8, 7/8     Precautions: Standard    Subjective     Chief Concern: Recurring sinusitis with sinus/tension headaches, fatigue, anxiety/stress. Low back pain and upper back/shoulder tension.     Medical necessity is demonstrated by the following IMPAIRMENTS: Medical Necessity: Decreased quality of life              Aggravating Factors:  prolonged sitting, weather changes, and stress   Relieving Factors:  rest    Symptom Description:     Quality:  Throbbing, Tight, and Sharp  Severity:  4  Frequency:  every day and at night/ wakes from sleep at night        Objective       New Findings:      Treatment     Treatment Principles:  Invigorate qi and blood, nourish qi, strengthen middle to transform damp, stop pain     Acupuncture points used:     Bilateral points: LI20, BL2, [place], SP6  Unilateral points: BL62, KD6, GB40, BL65, KD3, SP4, LV3, LI4, SI3, TW3, LI11, LU7, PC6, HT7, LU5, CV12  Auricular Treatment:      Needles In: 24  Needles Out: 24  Needles W/O STIM placed: 8:50  [place] W/O STIM removed: 9:20      Assessment     After treatment, patient felt symptoms improve with acupuncture, continue with care as needed    Patient prognosis is Good.     Patient will continue to benefit from acupuncture treatment to address the deficits listed in the problem list box on initial evaluation, provide patient family education and to maximize pt's level of independence in the home and community environment.     Patient's spiritual, cultural and educational needs considered and pt agreeable to plan of care and goals.     Anticipated barriers to treatment: none    Plan      Recommend 1 /week for 1 sessions then re-assess.      Education:  Patient is aware of cumulative benefit of acupuncture

## 2024-09-21 ENCOUNTER — IMMUNIZATION (OUTPATIENT)
Dept: PRIMARY CARE CLINIC | Facility: CLINIC | Age: 44
End: 2024-09-21
Payer: OTHER GOVERNMENT

## 2024-09-21 DIAGNOSIS — Z23 NEED FOR VACCINATION: Primary | ICD-10-CM

## 2024-09-21 PROCEDURE — 99999PBSHW INFLUENZA - TRIVALENT - PF (ADULT): Mod: PBBFAC,,,

## 2024-09-21 PROCEDURE — 90471 IMMUNIZATION ADMIN: CPT | Mod: PBBFAC

## 2024-09-21 PROCEDURE — 90656 IIV3 VACC NO PRSV 0.5 ML IM: CPT | Mod: PBBFAC

## 2024-12-31 DIAGNOSIS — Z01.89 ENCOUNTER FOR OTHER SPECIFIED SPECIAL EXAMINATIONS: Primary | ICD-10-CM

## 2025-05-29 ENCOUNTER — OFFICE VISIT (OUTPATIENT)
Dept: DERMATOLOGY | Facility: CLINIC | Age: 45
End: 2025-05-29
Payer: OTHER GOVERNMENT

## 2025-05-29 DIAGNOSIS — D22.9 MULTIPLE BENIGN MELANOCYTIC NEVI: Primary | ICD-10-CM

## 2025-05-29 DIAGNOSIS — D18.01 ANGIOMA OF SKIN: ICD-10-CM

## 2025-05-29 DIAGNOSIS — Z12.83 SCREENING EXAM FOR SKIN CANCER: ICD-10-CM

## 2025-05-29 DIAGNOSIS — L81.4 LENTIGINES: ICD-10-CM

## 2025-05-29 DIAGNOSIS — L73.8 SEBACEOUS HYPERPLASIA: ICD-10-CM

## 2025-05-29 NOTE — PROGRESS NOTES
"  Patient Information  Name: Patrizia Abel  : 1980  MRN: [id number]     Referring Physician:  No ref. provider found   Primary Care Physician:  Shanna Palacio MD   Date of Visit: 25      Subjective:     History of Present lllness:    Patrizia Abel is a 44 y.o. female who presents with a chief complaint of moles.  Patient is here today for a "mole" check.   Today, patient has no additional complaints. Denies any new, changing, or symptomatic lesions on the skin.    Clinical documentation obtained by nursing staff reviewed.    Review of Systems   Skin:  Positive for daily sunscreen use, activity-related sunscreen use and wears hat. Negative for itching and rash.       Objective:   Physical Exam   Constitutional: She appears well-developed and well-nourished. No distress.   Neurological: She is alert and oriented to person, place, and time. She is not disoriented.   Psychiatric: She has a normal mood and affect.   Skin:   Areas Examined (abnormalities noted in diagram):   Scalp / Hair Palpated and Inspected  Head / Face Inspection Performed  Neck Inspection Performed  Chest / Axilla Inspection Performed  Abdomen Inspection Performed  Genitals / Buttocks / Groin Inspection Performed  Back Inspection Performed  RUE Inspected  LUE Inspection Performed  RLE Inspected  LLE Inspection Performed  Nails and Digits Inspection Performed                     Diagram Legend     Erythematous scaling macule/papule c/w actinic keratosis       Vascular papule c/w angioma      Pigmented verrucoid papule/plaque c/w seborrheic keratosis      Yellow umbilicated papule c/w sebaceous hyperplasia      Irregularly shaped tan macule c/w lentigo     1-2 mm smooth white papules consistent with Milia      Movable subcutaneous cyst with punctum c/w epidermal inclusion cyst      Subcutaneous movable cyst c/w pilar cyst      Firm pink to brown papule c/w dermatofibroma      Pedunculated fleshy papule(s) c/w skin " tag(s)      Evenly pigmented macule c/w junctional nevus     Mildly variegated pigmented, slightly irregular-bordered macule c/w mildly atypical nevus      Flesh colored to evenly pigmented papule c/w intradermal nevus       Pink pearly papule/plaque c/w basal cell carcinoma      Erythematous hyperkeratotic cursted plaque c/w SCC      Surgical scar with no sign of skin cancer recurrence      Open and closed comedones      Inflammatory papules and pustules      Verrucoid papule consistent consistent with wart     Erythematous eczematous patches and plaques     Dystrophic onycholytic nail with subungual debris c/w onychomycosis     Umbilicated papule    Erythematous-base heme-crusted tan verrucoid plaque consistent with inflamed seborrheic keratosis     Erythematous Silvery Scaling Plaque c/w Psoriasis     See annotation    No images are attached to the encounter or orders placed in the encounter.      [] Data reviewed  [] Prior external notes reviewed  [] Independent review of test  [] Management discussed with another provider  [] Independent historian    Assessment / Plan:        Multiple benign melanocytic nevi  Multiple benign-appearing nevi present on exam today. Reassurance provided. Counseled patient to periodically examine moles and return to clinic if any changes in size, shape, or color are noted or if it becomes symptomatic (bleeding, itching, pain, etc).  Recommend using a broad-spectrum, water-resistant sunscreen with SPF of 30 or higher--reapply every 2 hours. Seek shade, wear sun-protective clothing, and perform regular skin self-exams.    Lentigines  These are benign sun spots which should be monitored for changes. Daily sun protection will reduce the number of new lesions.   Recommend using a broad-spectrum, water-resistant sunscreen with SPF of 30 or higher--reapply every 2 hours. Seek shade, wear sun-protective clothing, and perform regular skin self-exams.    Angioma of skin  These are benign  vascular lesions that are inherited. Treatment is not necessary.    Sebaceous hyperplasia  This is a common condition representing benign enlargement of oil glands. It typically occurs in adulthood. Reassurance was given to the patient. No treatment required.    Screening exam for skin cancer  Total body skin examination performed today as noted in physical exam. No lesions suspicious for malignancy were seen.  Recommend using a broad-spectrum, water-resistant sunscreen with SPF of 30 or higher--reapply every 2 hours. Seek shade, wear sun-protective clothing, and perform regular skin self-exams.       Follow up in about 2 years (around 5/29/2027) for TBSE, or sooner if any new problems or changing lesions.      Raisa Joseph MD, FAAD  [facility]